# Patient Record
Sex: FEMALE | Race: BLACK OR AFRICAN AMERICAN | NOT HISPANIC OR LATINO | Employment: FULL TIME | URBAN - METROPOLITAN AREA
[De-identification: names, ages, dates, MRNs, and addresses within clinical notes are randomized per-mention and may not be internally consistent; named-entity substitution may affect disease eponyms.]

---

## 2023-04-19 LAB
ALANINE AMINOTRANSFERASE (SGPT) (U/L) IN SER/PLAS: 13 U/L (ref 7–45)
ALBUMIN (G/DL) IN SER/PLAS: 4.2 G/DL (ref 3.4–5)
ALKALINE PHOSPHATASE (U/L) IN SER/PLAS: 57 U/L (ref 33–110)
ANION GAP IN SER/PLAS: 11 MMOL/L (ref 10–20)
ASPARTATE AMINOTRANSFERASE (SGOT) (U/L) IN SER/PLAS: 11 U/L (ref 9–39)
BILIRUBIN TOTAL (MG/DL) IN SER/PLAS: 0.4 MG/DL (ref 0–1.2)
CALCIDIOL (25 OH VITAMIN D3) (NG/ML) IN SER/PLAS: 26 NG/ML
CALCIUM (MG/DL) IN SER/PLAS: 9.6 MG/DL (ref 8.6–10.6)
CARBON DIOXIDE, TOTAL (MMOL/L) IN SER/PLAS: 26 MMOL/L (ref 21–32)
CHLORIDE (MMOL/L) IN SER/PLAS: 104 MMOL/L (ref 98–107)
CHOLESTEROL (MG/DL) IN SER/PLAS: 165 MG/DL (ref 0–199)
CHOLESTEROL IN HDL (MG/DL) IN SER/PLAS: 57.7 MG/DL
CHOLESTEROL/HDL RATIO: 2.9
CREATININE (MG/DL) IN SER/PLAS: 0.67 MG/DL (ref 0.5–1.05)
ERYTHROCYTE DISTRIBUTION WIDTH (RATIO) BY AUTOMATED COUNT: 13.8 % (ref 11.5–14.5)
ERYTHROCYTE MEAN CORPUSCULAR HEMOGLOBIN CONCENTRATION (G/DL) BY AUTOMATED: 34.8 G/DL (ref 32–36)
ERYTHROCYTE MEAN CORPUSCULAR VOLUME (FL) BY AUTOMATED COUNT: 75 FL (ref 80–100)
ERYTHROCYTES (10*6/UL) IN BLOOD BY AUTOMATED COUNT: 4.62 X10E12/L (ref 4–5.2)
GFR FEMALE: >90 ML/MIN/1.73M2
GLUCOSE (MG/DL) IN SER/PLAS: 84 MG/DL (ref 74–99)
HEMATOCRIT (%) IN BLOOD BY AUTOMATED COUNT: 34.8 % (ref 36–46)
HEMOGLOBIN (G/DL) IN BLOOD: 12.1 G/DL (ref 12–16)
LDL: 97 MG/DL (ref 0–99)
LEUKOCYTES (10*3/UL) IN BLOOD BY AUTOMATED COUNT: 4.6 X10E9/L (ref 4.4–11.3)
NRBC (PER 100 WBCS) BY AUTOMATED COUNT: 0 /100 WBC (ref 0–0)
PLATELETS (10*3/UL) IN BLOOD AUTOMATED COUNT: 332 X10E9/L (ref 150–450)
POTASSIUM (MMOL/L) IN SER/PLAS: 4.1 MMOL/L (ref 3.5–5.3)
PROTEIN TOTAL: 7 G/DL (ref 6.4–8.2)
SODIUM (MMOL/L) IN SER/PLAS: 137 MMOL/L (ref 136–145)
THYROTROPIN (MIU/L) IN SER/PLAS BY DETECTION LIMIT <= 0.05 MIU/L: 0.71 MIU/L (ref 0.44–3.98)
THYROXINE (T4) FREE (NG/DL) IN SER/PLAS: 1.05 NG/DL (ref 0.78–1.48)
TRIGLYCERIDE (MG/DL) IN SER/PLAS: 53 MG/DL (ref 0–149)
UREA NITROGEN (MG/DL) IN SER/PLAS: 9 MG/DL (ref 6–23)
VLDL: 11 MG/DL (ref 0–40)

## 2023-09-12 LAB
CHLAMYDIA TRACH., AMPLIFIED: NEGATIVE
N. GONORRHEA, AMPLIFIED: NEGATIVE
TRICHOMONAS VAGINALIS: NEGATIVE
URINE CULTURE: NO GROWTH

## 2023-10-10 ENCOUNTER — LAB (OUTPATIENT)
Dept: LAB | Facility: LAB | Age: 33
End: 2023-10-10
Payer: COMMERCIAL

## 2023-10-10 DIAGNOSIS — Z32.01 ENCOUNTER FOR PREGNANCY TEST, RESULT POSITIVE (HHS-HCC): Primary | ICD-10-CM

## 2023-10-10 LAB
ABO GROUP (TYPE) IN BLOOD: NORMAL
ANTIBODY SCREEN: NORMAL
ERYTHROCYTE [DISTWIDTH] IN BLOOD BY AUTOMATED COUNT: 13.3 % (ref 11.5–14.5)
HCT VFR BLD AUTO: 29.9 % (ref 36–46)
HCV AB SER QL: NONREACTIVE
HGB BLD-MCNC: 10.7 G/DL (ref 12–16)
MCH RBC QN AUTO: 27.3 PG (ref 26–34)
MCHC RBC AUTO-ENTMCNC: 35.8 G/DL (ref 32–36)
MCV RBC AUTO: 76 FL (ref 80–100)
NRBC BLD-RTO: 0 /100 WBCS (ref 0–0)
PLATELET # BLD AUTO: 332 X10*3/UL (ref 150–450)
PMV BLD AUTO: 12.1 FL (ref 7.5–11.5)
RBC # BLD AUTO: 3.92 X10*6/UL (ref 4–5.2)
RH FACTOR (ANTIGEN D): NORMAL
WBC # BLD AUTO: 9.5 X10*3/UL (ref 4.4–11.3)

## 2023-10-10 PROCEDURE — 86803 HEPATITIS C AB TEST: CPT

## 2023-10-10 PROCEDURE — 86780 TREPONEMA PALLIDUM: CPT

## 2023-10-10 PROCEDURE — 83550 IRON BINDING TEST: CPT

## 2023-10-10 PROCEDURE — 86900 BLOOD TYPING SEROLOGIC ABO: CPT

## 2023-10-10 PROCEDURE — 86850 RBC ANTIBODY SCREEN: CPT

## 2023-10-10 PROCEDURE — 87340 HEPATITIS B SURFACE AG IA: CPT

## 2023-10-10 PROCEDURE — 83020 HEMOGLOBIN ELECTROPHORESIS: CPT | Performed by: PATHOLOGY

## 2023-10-10 PROCEDURE — 86317 IMMUNOASSAY INFECTIOUS AGENT: CPT

## 2023-10-10 PROCEDURE — 87389 HIV-1 AG W/HIV-1&-2 AB AG IA: CPT

## 2023-10-10 PROCEDURE — 83021 HEMOGLOBIN CHROMOTOGRAPHY: CPT

## 2023-10-10 PROCEDURE — 36415 COLL VENOUS BLD VENIPUNCTURE: CPT

## 2023-10-10 PROCEDURE — 83020 HEMOGLOBIN ELECTROPHORESIS: CPT

## 2023-10-10 PROCEDURE — 85027 COMPLETE CBC AUTOMATED: CPT

## 2023-10-10 PROCEDURE — 82728 ASSAY OF FERRITIN: CPT

## 2023-10-10 PROCEDURE — 86901 BLOOD TYPING SEROLOGIC RH(D): CPT

## 2023-10-11 PROBLEM — N90.89 VULVAR LESION: Status: ACTIVE | Noted: 2023-10-11

## 2023-10-11 PROBLEM — F41.9 ANXIETY: Status: ACTIVE | Noted: 2023-10-11

## 2023-10-11 PROBLEM — E55.9 VITAMIN D INSUFFICIENCY: Status: ACTIVE | Noted: 2023-10-11

## 2023-10-11 PROBLEM — D57.3 SICKLE CELL TRAIT (CMS-HCC): Status: ACTIVE | Noted: 2023-10-11

## 2023-10-11 PROBLEM — R63.0 ANOREXIA: Status: ACTIVE | Noted: 2023-10-11

## 2023-10-11 PROBLEM — M41.9 SCOLIOSIS: Status: ACTIVE | Noted: 2023-10-11

## 2023-10-11 LAB
FERRITIN SERPL-MCNC: 68 NG/ML
HBV SURFACE AG SERPL QL IA: NONREACTIVE
HIV 1+2 AB+HIV1 P24 AG SERPL QL IA: NONREACTIVE
IRON SATN MFR SERPL: 21 %
IRON SERPL-MCNC: 84 UG/DL
RUBV IGG SERPL IA-ACNC: 1.7 IA
RUBV IGG SERPL QL IA: POSITIVE
T PALLIDUM AB SER QL: NONREACTIVE
TIBC SERPL-MCNC: 392 UG/DL
UIBC SERPL-MCNC: 308 UG/DL

## 2023-10-11 RX ORDER — ETHYNODIOL DIACETATE AND ETHINYL ESTRADIOL 1 MG-35MCG
1 KIT ORAL DAILY
COMMUNITY
Start: 2015-10-27 | End: 2023-10-12

## 2023-10-11 RX ORDER — ERGOCALCIFEROL 1.25 MG/1
1.25 CAPSULE ORAL
COMMUNITY
Start: 2020-10-05

## 2023-10-12 ENCOUNTER — ROUTINE PRENATAL (OUTPATIENT)
Dept: OBSTETRICS AND GYNECOLOGY | Facility: CLINIC | Age: 33
End: 2023-10-12
Payer: COMMERCIAL

## 2023-10-12 VITALS — SYSTOLIC BLOOD PRESSURE: 112 MMHG | DIASTOLIC BLOOD PRESSURE: 50 MMHG | BODY MASS INDEX: 22.09 KG/M2 | WEIGHT: 115 LBS

## 2023-10-12 DIAGNOSIS — Z34.02 PREGNANCY TEST POSITIVE FOR NORMAL FIRST PREGNANCY IN SECOND TRIMESTER (HHS-HCC): Primary | ICD-10-CM

## 2023-10-12 DIAGNOSIS — Z3A.15 15 WEEKS GESTATION OF PREGNANCY (HHS-HCC): ICD-10-CM

## 2023-10-12 LAB
HGB A MFR BLD ELPH: 62.7 % (ref 95.8–98)
HGB A2 MFR BLD ELPH: 3.8 % (ref 2–3.3)
HGB F MFR BLD ELPH: 0 % (ref 0–0.9)
HGB FRACT BLD CE-IMP: ABNORMAL
HGB XXX MFR BLD ELPH: ABNORMAL %

## 2023-10-12 PROCEDURE — 0501F PRENATAL FLOW SHEET: CPT | Performed by: ADVANCED PRACTICE MIDWIFE

## 2023-10-12 RX ORDER — NAPROXEN SODIUM 220 MG/1
162 TABLET, FILM COATED ORAL DAILY
Qty: 60 TABLET | Refills: 11 | Status: SHIPPED | OUTPATIENT
Start: 2023-10-12 | End: 2023-10-27 | Stop reason: SDUPTHER

## 2023-10-12 NOTE — PROGRESS NOTES
"Assessment/Plan   Problem List Items Addressed This Visit    None  Visit Diagnoses         Codes    Pregnancy test positive for normal first pregnancy in second trimester    -  Primary Z34.02    Relevant Orders    US [facility] OB imaging order    15 weeks gestation of pregnancy     Z3A.15            Labs reviewed.  Anatomy US scheduled  To call and get scheduled.  Order placed.  Reviewed s/sx of PTL, warning signs, fetal movement counts, and when to call provider  Follow up in 4 weeks for a routine prenatal visit.    Martha Osborn, JASWANT-BRISA Pierre     Thang Holland is a 33 y.o.  at 15w1d with a working estimated date of delivery of 4/3/2024, by Last Menstrual Period who presents for a routine prenatal visit. She denies vaginal bleeding, leakage of fluid, decreased fetal movements, or contractions.    Her pregnancy is complicated by:  Pregnancy Problems (from 23 to present)       No problems associated with this episode.             Objective   Physical Exam  Weight: 52.2 kg (115 lb)  Expected Total Weight Gain: Could not be calculated   Pregravid BMI: Could not be calculated  BP: 112/50         Postpartum Depression: Not on file       Prenatal Labs  Lab Results   Component Value Date    HGB 10.7 (L) 10/10/2023    HCT 29.9 (L) 10/10/2023    ABO B 10/10/2023    HEPBSAG Nonreactive 10/10/2023     No results found for: \"GLUC1P\", \"GL3\"      A:  IUP @ 15 weeks        S=D    P:  Discussed quickening and round ligament pain       Anatomy ultrasound placed, numbers given to call       Declines aneuploidy screening       RTO 4 weeks     "

## 2023-10-26 DIAGNOSIS — Z34.02 PREGNANCY TEST POSITIVE FOR NORMAL FIRST PREGNANCY IN SECOND TRIMESTER (HHS-HCC): ICD-10-CM

## 2023-10-27 RX ORDER — NAPROXEN SODIUM 220 MG/1
162 TABLET, FILM COATED ORAL DAILY
Qty: 180 TABLET | Refills: 4 | Status: SHIPPED | OUTPATIENT
Start: 2023-10-27 | End: 2024-03-09 | Stop reason: HOSPADM

## 2023-11-03 ENCOUNTER — APPOINTMENT (OUTPATIENT)
Dept: RADIOLOGY | Facility: CLINIC | Age: 33
End: 2023-11-03
Payer: COMMERCIAL

## 2023-11-06 ENCOUNTER — ROUTINE PRENATAL (OUTPATIENT)
Dept: OBSTETRICS AND GYNECOLOGY | Facility: CLINIC | Age: 33
End: 2023-11-06
Payer: COMMERCIAL

## 2023-11-06 ENCOUNTER — ANCILLARY PROCEDURE (OUTPATIENT)
Dept: RADIOLOGY | Facility: CLINIC | Age: 33
End: 2023-11-06
Payer: COMMERCIAL

## 2023-11-06 VITALS — BODY MASS INDEX: 22.09 KG/M2 | DIASTOLIC BLOOD PRESSURE: 60 MMHG | SYSTOLIC BLOOD PRESSURE: 102 MMHG | WEIGHT: 115 LBS

## 2023-11-06 DIAGNOSIS — Z34.02 PREGNANCY TEST POSITIVE FOR NORMAL FIRST PREGNANCY IN SECOND TRIMESTER (HHS-HCC): ICD-10-CM

## 2023-11-06 DIAGNOSIS — Z34.02 PREGNANCY TEST POSITIVE FOR NORMAL FIRST PREGNANCY IN SECOND TRIMESTER (HHS-HCC): Primary | ICD-10-CM

## 2023-11-06 PROCEDURE — 76805 OB US >/= 14 WKS SNGL FETUS: CPT

## 2023-11-06 PROCEDURE — 90662 IIV NO PRSV INCREASED AG IM: CPT | Performed by: OBSTETRICS & GYNECOLOGY

## 2023-11-06 PROCEDURE — 0501F PRENATAL FLOW SHEET: CPT | Performed by: OBSTETRICS & GYNECOLOGY

## 2023-11-06 PROCEDURE — 90471 IMMUNIZATION ADMIN: CPT | Performed by: OBSTETRICS & GYNECOLOGY

## 2023-11-06 PROCEDURE — 76811 OB US DETAILED SNGL FETUS: CPT | Performed by: OBSTETRICS & GYNECOLOGY

## 2023-11-06 NOTE — PROGRESS NOTES
33-year-old G1, P0 presents at 18 weeks and 5 days by LMP consistent with second menstrual ultrasound for follow-up OB visit.  She states that she is feeling well.  She denies nausea, vomiting, abnormal discharge, symptoms of PTL or PEC.    She reports compliance with vitamin D, prenatal vitamins and her baby aspirin.    Recent ultrasound showed normal anatomy.    Hemoglobin electrophoresis was abnormal.  The patient states that the FOB still needs to do testing for sickle cell trait.    A: JOSÉ     -  Genetics for abnormal hemoglobin ID    -  FOB to get testing performed     -  Flu vaccine today     -  RTC Q 4 weeks

## 2023-11-09 LAB — REFLEX ADDED, ANEMIA PANEL: NORMAL

## 2023-12-18 ENCOUNTER — ROUTINE PRENATAL (OUTPATIENT)
Dept: OBSTETRICS AND GYNECOLOGY | Facility: CLINIC | Age: 33
End: 2023-12-18
Payer: COMMERCIAL

## 2023-12-18 VITALS — SYSTOLIC BLOOD PRESSURE: 100 MMHG | BODY MASS INDEX: 24.78 KG/M2 | WEIGHT: 129 LBS | DIASTOLIC BLOOD PRESSURE: 60 MMHG

## 2023-12-18 DIAGNOSIS — Z3A.24 24 WEEKS GESTATION OF PREGNANCY (HHS-HCC): Primary | ICD-10-CM

## 2023-12-18 DIAGNOSIS — Z34.02 ENCOUNTER FOR SUPERVISION OF NORMAL FIRST PREGNANCY, SECOND TRIMESTER (HHS-HCC): ICD-10-CM

## 2023-12-18 PROCEDURE — 0501F PRENATAL FLOW SHEET: CPT

## 2023-12-18 NOTE — PROGRESS NOTES
Assessment/Plan   Diagnoses and all orders for this visit:  24 weeks gestation of pregnancy  Encounter for supervision of normal first pregnancy, second trimester      Labs reviewed.  Discussed diabetes screening and routine labs, to be completed next visit  Discussed delayed cord clamping. Plan to discuss birth plan more in detail at later visit.   Patient is doing well managing heartburn at home. Reviewed sitting upright and hydration.   Reviewed s/sx of PTL, warning signs, fetal movement counts, and when to call provider  Follow up in 4 weeks for a routine prenatal visit.    JASWANT Gonsales-BRISA    Subjective     Thang Holland is a 33 y.o.  at 24w5d with a working estimated date of delivery of 4/3/2024, by Last Menstrual Period who presents for a routine prenatal visit. She denies vaginal bleeding, leakage of fluid, decreased fetal movements, or contractions.  Pt complains of:  heartburn     Patient also expressed desire for delayed cord clamping. Patient also expressed concerns about hx of scoliosis surgery and epidural placement.     Her pregnancy is complicated by:  Pregnancy Problems (from 23 to present)       No problems associated with this episode.             Objective   Physical Exam  Weight: 58.5 kg (129 lb)  Expected Total Weight Gain: 11.5 kg (25 lb)-16 kg (35 lb)   Pregravid BMI: 21.12  BP: 100/60  Fetal Heart Rate: 160 Fundal Height (cm): 25 cm  Fetal Heart Rate: 160 Fundal Height (cm): 25 cm

## 2024-01-16 ENCOUNTER — ROUTINE PRENATAL (OUTPATIENT)
Dept: OBSTETRICS AND GYNECOLOGY | Facility: CLINIC | Age: 34
End: 2024-01-16
Payer: COMMERCIAL

## 2024-01-16 VITALS — DIASTOLIC BLOOD PRESSURE: 60 MMHG | WEIGHT: 138 LBS | SYSTOLIC BLOOD PRESSURE: 100 MMHG | BODY MASS INDEX: 26.51 KG/M2

## 2024-01-16 DIAGNOSIS — Z34.03 ENCOUNTER FOR SUPERVISION OF NORMAL FIRST PREGNANCY IN THIRD TRIMESTER (HHS-HCC): Primary | ICD-10-CM

## 2024-01-16 LAB
ERYTHROCYTE [DISTWIDTH] IN BLOOD BY AUTOMATED COUNT: 13.1 % (ref 11.5–14.5)
HCT VFR BLD AUTO: 27.2 % (ref 36–46)
HGB BLD-MCNC: 9.5 G/DL (ref 12–16)
MCH RBC QN AUTO: 27.6 PG (ref 26–34)
MCHC RBC AUTO-ENTMCNC: 34.9 G/DL (ref 32–36)
MCV RBC AUTO: 79 FL (ref 80–100)
NRBC BLD-RTO: 0 /100 WBCS (ref 0–0)
PLATELET # BLD AUTO: 294 X10*3/UL (ref 150–450)
RBC # BLD AUTO: 3.44 X10*6/UL (ref 4–5.2)
REFLEX ADDED, ANEMIA PANEL: NORMAL
WBC # BLD AUTO: 12.3 X10*3/UL (ref 4.4–11.3)

## 2024-01-16 PROCEDURE — 85027 COMPLETE CBC AUTOMATED: CPT

## 2024-01-16 PROCEDURE — 0501F PRENATAL FLOW SHEET: CPT | Performed by: OBSTETRICS & GYNECOLOGY

## 2024-01-16 PROCEDURE — 82728 ASSAY OF FERRITIN: CPT

## 2024-01-16 PROCEDURE — 86780 TREPONEMA PALLIDUM: CPT

## 2024-01-16 PROCEDURE — 82947 ASSAY GLUCOSE BLOOD QUANT: CPT

## 2024-01-16 PROCEDURE — 83550 IRON BINDING TEST: CPT

## 2024-01-16 PROCEDURE — 36415 COLL VENOUS BLD VENIPUNCTURE: CPT

## 2024-01-16 ASSESSMENT — EDINBURGH POSTNATAL DEPRESSION SCALE (EPDS)
I HAVE BEEN SO UNHAPPY THAT I HAVE BEEN CRYING: NO, NEVER
THE THOUGHT OF HARMING MYSELF HAS OCCURRED TO ME: NEVER
I HAVE BEEN ABLE TO LAUGH AND SEE THE FUNNY SIDE OF THINGS: AS MUCH AS I ALWAYS COULD
I HAVE FELT SCARED OR PANICKY FOR NO GOOD REASON: NO, NOT AT ALL
I HAVE FELT SAD OR MISERABLE: NOT VERY OFTEN
I HAVE BLAMED MYSELF UNNECESSARILY WHEN THINGS WENT WRONG: NOT VERY OFTEN
I HAVE BEEN SO UNHAPPY THAT I HAVE HAD DIFFICULTY SLEEPING: NOT VERY OFTEN
THINGS HAVE BEEN GETTING ON TOP OF ME: NO, I HAVE BEEN COPING AS WELL AS EVER
I HAVE LOOKED FORWARD WITH ENJOYMENT TO THINGS: AS MUCH AS I EVER DID
I HAVE BEEN ANXIOUS OR WORRIED FOR NO GOOD REASON: HARDLY EVER
TOTAL SCORE: 4

## 2024-01-16 NOTE — PROGRESS NOTES
The patient reports good fetal movement.  She is without symptoms of PTL or PEC.  She is unsure what she would like to use for birth control.  She plans to breast-feed.  She wants to deliver at the main campus.    She has second trimester labs drawn today.  She plans to get the Tdap at her next visit.    A/P: JOSÉ    -  Fetal movement     -  BC info     -  Tdap at next visit     -  Second trimester labs today    -  Breast Pump rx

## 2024-01-17 LAB
FERRITIN SERPL-MCNC: 23 NG/ML
GLUCOSE 1H P 50 G GLC PO SERPL-MCNC: 104 MG/DL
IRON SATN MFR SERPL: NORMAL %
IRON SERPL-MCNC: 49 UG/DL
TIBC SERPL-MCNC: NORMAL UG/DL
TREPONEMA PALLIDUM IGG+IGM AB [PRESENCE] IN SERUM OR PLASMA BY IMMUNOASSAY: NONREACTIVE
UIBC SERPL-MCNC: >450 UG/DL

## 2024-01-19 DIAGNOSIS — O99.013 ANEMIA AFFECTING PREGNANCY IN THIRD TRIMESTER (HHS-HCC): Primary | ICD-10-CM

## 2024-01-19 RX ORDER — FERROUS SULFATE 325(65) MG
325 TABLET, DELAYED RELEASE (ENTERIC COATED) ORAL
Qty: 60 TABLET | Refills: 11 | Status: SHIPPED | OUTPATIENT
Start: 2024-01-19 | End: 2025-01-18

## 2024-01-22 ENCOUNTER — TELEPHONE (OUTPATIENT)
Dept: OBSTETRICS AND GYNECOLOGY | Facility: CLINIC | Age: 34
End: 2024-01-22
Payer: COMMERCIAL

## 2024-01-22 NOTE — TELEPHONE ENCOUNTER
Pt verified by name and .  Pt is aware of Dr. Sigala's message;  Chelsea Sigala MD  [facility] [address] Clinical Support Staff  Iron rx sent to the pharmacy. She may use [facility] prn.  Pt has no questions at this time.

## 2024-01-28 ENCOUNTER — HOSPITAL ENCOUNTER (EMERGENCY)
Facility: HOSPITAL | Age: 34
Discharge: OTHER INSTITUTION | DRG: 833 | End: 2024-01-28
Attending: EMERGENCY MEDICINE
Payer: COMMERCIAL

## 2024-01-28 ENCOUNTER — HOSPITAL ENCOUNTER (INPATIENT)
Facility: HOSPITAL | Age: 34
LOS: 3 days | Discharge: HOME | DRG: 833 | End: 2024-01-31
Attending: STUDENT IN AN ORGANIZED HEALTH CARE EDUCATION/TRAINING PROGRAM | Admitting: STUDENT IN AN ORGANIZED HEALTH CARE EDUCATION/TRAINING PROGRAM
Payer: COMMERCIAL

## 2024-01-28 ENCOUNTER — HOSPITAL ENCOUNTER (INPATIENT)
Facility: HOSPITAL | Age: 34
End: 2024-01-28
Attending: STUDENT IN AN ORGANIZED HEALTH CARE EDUCATION/TRAINING PROGRAM | Admitting: STUDENT IN AN ORGANIZED HEALTH CARE EDUCATION/TRAINING PROGRAM
Payer: COMMERCIAL

## 2024-01-28 VITALS
RESPIRATION RATE: 18 BRPM | WEIGHT: 130 LBS | HEIGHT: 60 IN | BODY MASS INDEX: 25.52 KG/M2 | HEART RATE: 90 BPM | OXYGEN SATURATION: 98 % | SYSTOLIC BLOOD PRESSURE: 120 MMHG | TEMPERATURE: 98.1 F | DIASTOLIC BLOOD PRESSURE: 77 MMHG

## 2024-01-28 DIAGNOSIS — O46.93 VAGINAL BLEEDING IN PREGNANCY, THIRD TRIMESTER (HHS-HCC): Primary | ICD-10-CM

## 2024-01-28 LAB
ABO GROUP (TYPE) IN BLOOD: NORMAL
ABO GROUP (TYPE) IN BLOOD: NORMAL
ALBUMIN SERPL-MCNC: 3.1 G/DL (ref 3.5–5)
ALP BLD-CCNC: 159 U/L (ref 35–125)
ALT SERPL-CCNC: 16 U/L (ref 5–40)
ANION GAP SERPL CALC-SCNC: 12 MMOL/L
ANTIBODY SCREEN: NORMAL
ANTIBODY SCREEN: NORMAL
APPEARANCE UR: CLEAR
APTT PPP: 29 SECONDS (ref 27–38)
APTT PPP: 30 SECONDS (ref 27–38)
AST SERPL-CCNC: 19 U/L (ref 5–40)
BACTERIA #/AREA URNS AUTO: ABNORMAL /HPF
BASOPHILS # BLD AUTO: 0.02 X10*3/UL (ref 0–0.1)
BASOPHILS NFR BLD AUTO: 0.2 %
BILIRUB SERPL-MCNC: <0.2 MG/DL (ref 0.1–1.2)
BILIRUB UR STRIP.AUTO-MCNC: NEGATIVE MG/DL
BUN SERPL-MCNC: 11 MG/DL (ref 8–25)
CALCIUM SERPL-MCNC: 9.5 MG/DL (ref 8.5–10.4)
CHLORIDE SERPL-SCNC: 104 MMOL/L (ref 97–107)
CO2 SERPL-SCNC: 19 MMOL/L (ref 24–31)
COLOR UR: COLORLESS
CREAT SERPL-MCNC: 0.7 MG/DL (ref 0.4–1.6)
EGFRCR SERPLBLD CKD-EPI 2021: >90 ML/MIN/1.73M*2
EOSINOPHIL # BLD AUTO: 0.15 X10*3/UL (ref 0–0.7)
EOSINOPHIL NFR BLD AUTO: 1.2 %
ERYTHROCYTE [DISTWIDTH] IN BLOOD BY AUTOMATED COUNT: 12.7 % (ref 11.5–14.5)
ERYTHROCYTE [DISTWIDTH] IN BLOOD BY AUTOMATED COUNT: 12.7 % (ref 11.5–14.5)
ERYTHROCYTE [DISTWIDTH] IN BLOOD BY AUTOMATED COUNT: 13.2 % (ref 11.5–14.5)
FIBRINOGEN PPP-MCNC: 431 MG/DL (ref 200–400)
FIBRINOGEN PPP-MCNC: 458 MG/DL (ref 200–400)
GLUCOSE SERPL-MCNC: 80 MG/DL (ref 65–99)
GLUCOSE UR STRIP.AUTO-MCNC: NORMAL MG/DL
HCT VFR BLD AUTO: 21.4 % (ref 36–46)
HCT VFR BLD AUTO: 21.6 % (ref 36–46)
HCT VFR BLD AUTO: 23.3 % (ref 36–46)
HGB BLD-MCNC: 7.6 G/DL (ref 12–16)
HGB BLD-MCNC: 7.7 G/DL (ref 12–16)
HGB BLD-MCNC: 8.2 G/DL (ref 12–16)
HOLD SPECIMEN: NORMAL
IMM GRANULOCYTES # BLD AUTO: 0.08 X10*3/UL (ref 0–0.7)
IMM GRANULOCYTES NFR BLD AUTO: 0.7 % (ref 0–0.9)
INR PPP: 0.9 (ref 0.9–1.1)
INR PPP: 0.9 (ref 0.9–1.1)
KETONES UR STRIP.AUTO-MCNC: NEGATIVE MG/DL
LEUKOCYTE ESTERASE UR QL STRIP.AUTO: ABNORMAL
LYMPHOCYTES # BLD AUTO: 2.89 X10*3/UL (ref 1.2–4.8)
LYMPHOCYTES NFR BLD AUTO: 24 %
MCH RBC QN AUTO: 26.9 PG (ref 26–34)
MCH RBC QN AUTO: 27 PG (ref 26–34)
MCH RBC QN AUTO: 27.5 PG (ref 26–34)
MCHC RBC AUTO-ENTMCNC: 35.2 G/DL (ref 32–36)
MCHC RBC AUTO-ENTMCNC: 35.2 G/DL (ref 32–36)
MCHC RBC AUTO-ENTMCNC: 36 G/DL (ref 32–36)
MCV RBC AUTO: 76 FL (ref 80–100)
MCV RBC AUTO: 76 FL (ref 80–100)
MCV RBC AUTO: 77 FL (ref 80–100)
MONOCYTES # BLD AUTO: 0.77 X10*3/UL (ref 0.1–1)
MONOCYTES NFR BLD AUTO: 6.4 %
NEUTROPHILS # BLD AUTO: 8.12 X10*3/UL (ref 1.2–7.7)
NEUTROPHILS NFR BLD AUTO: 67.5 %
NITRITE UR QL STRIP.AUTO: NEGATIVE
NRBC BLD-RTO: 0 /100 WBCS (ref 0–0)
PH UR STRIP.AUTO: 6 [PH]
PLATELET # BLD AUTO: 226 X10*3/UL (ref 150–450)
PLATELET # BLD AUTO: 238 X10*3/UL (ref 150–450)
PLATELET # BLD AUTO: 241 X10*3/UL (ref 150–450)
POTASSIUM SERPL-SCNC: 3.9 MMOL/L (ref 3.4–5.1)
PROT SERPL-MCNC: 6.6 G/DL (ref 5.9–7.9)
PROT UR STRIP.AUTO-MCNC: NEGATIVE MG/DL
PROTHROMBIN TIME: 10.1 SECONDS (ref 9.8–12.8)
PROTHROMBIN TIME: 10.2 SECONDS (ref 9.8–12.8)
RBC # BLD AUTO: 2.8 X10*6/UL (ref 4–5.2)
RBC # BLD AUTO: 2.82 X10*6/UL (ref 4–5.2)
RBC # BLD AUTO: 3.05 X10*6/UL (ref 4–5.2)
RBC # UR STRIP.AUTO: ABNORMAL /UL
RBC #/AREA URNS AUTO: ABNORMAL /HPF
RH FACTOR (ANTIGEN D): NORMAL
RH FACTOR (ANTIGEN D): NORMAL
SODIUM SERPL-SCNC: 135 MMOL/L (ref 133–145)
SP GR UR STRIP.AUTO: 1
SQUAMOUS #/AREA URNS AUTO: ABNORMAL /HPF
UROBILINOGEN UR STRIP.AUTO-MCNC: NORMAL MG/DL
WBC # BLD AUTO: 10.4 X10*3/UL (ref 4.4–11.3)
WBC # BLD AUTO: 11.4 X10*3/UL (ref 4.4–11.3)
WBC # BLD AUTO: 12 X10*3/UL (ref 4.4–11.3)
WBC #/AREA URNS AUTO: ABNORMAL /HPF

## 2024-01-28 PROCEDURE — 59025 FETAL NON-STRESS TEST: CPT | Performed by: STUDENT IN AN ORGANIZED HEALTH CARE EDUCATION/TRAINING PROGRAM

## 2024-01-28 PROCEDURE — 81001 URINALYSIS AUTO W/SCOPE: CPT | Performed by: EMERGENCY MEDICINE

## 2024-01-28 PROCEDURE — 2500000004 HC RX 250 GENERAL PHARMACY W/ HCPCS (ALT 636 FOR OP/ED): Performed by: STUDENT IN AN ORGANIZED HEALTH CARE EDUCATION/TRAINING PROGRAM

## 2024-01-28 PROCEDURE — 86920 COMPATIBILITY TEST SPIN: CPT

## 2024-01-28 PROCEDURE — 85027 COMPLETE CBC AUTOMATED: CPT | Performed by: STUDENT IN AN ORGANIZED HEALTH CARE EDUCATION/TRAINING PROGRAM

## 2024-01-28 PROCEDURE — 85025 COMPLETE CBC W/AUTO DIFF WBC: CPT | Performed by: EMERGENCY MEDICINE

## 2024-01-28 PROCEDURE — 85610 PROTHROMBIN TIME: CPT | Performed by: STUDENT IN AN ORGANIZED HEALTH CARE EDUCATION/TRAINING PROGRAM

## 2024-01-28 PROCEDURE — 86901 BLOOD TYPING SEROLOGIC RH(D): CPT | Performed by: EMERGENCY MEDICINE

## 2024-01-28 PROCEDURE — 87081 CULTURE SCREEN ONLY: CPT | Performed by: STUDENT IN AN ORGANIZED HEALTH CARE EDUCATION/TRAINING PROGRAM

## 2024-01-28 PROCEDURE — 1220000001 HC OB SEMI-PRIVATE ROOM DAILY

## 2024-01-28 PROCEDURE — 85384 FIBRINOGEN ACTIVITY: CPT | Performed by: STUDENT IN AN ORGANIZED HEALTH CARE EDUCATION/TRAINING PROGRAM

## 2024-01-28 PROCEDURE — 59025 FETAL NON-STRESS TEST: CPT | Mod: GC | Performed by: STUDENT IN AN ORGANIZED HEALTH CARE EDUCATION/TRAINING PROGRAM

## 2024-01-28 PROCEDURE — 80053 COMPREHEN METABOLIC PANEL: CPT | Performed by: EMERGENCY MEDICINE

## 2024-01-28 PROCEDURE — 36415 COLL VENOUS BLD VENIPUNCTURE: CPT | Performed by: EMERGENCY MEDICINE

## 2024-01-28 PROCEDURE — 99223 1ST HOSP IP/OBS HIGH 75: CPT | Performed by: STUDENT IN AN ORGANIZED HEALTH CARE EDUCATION/TRAINING PROGRAM

## 2024-01-28 PROCEDURE — 86901 BLOOD TYPING SEROLOGIC RH(D): CPT | Performed by: STUDENT IN AN ORGANIZED HEALTH CARE EDUCATION/TRAINING PROGRAM

## 2024-01-28 PROCEDURE — 99285 EMERGENCY DEPT VISIT HI MDM: CPT | Performed by: EMERGENCY MEDICINE

## 2024-01-28 PROCEDURE — 2500000001 HC RX 250 WO HCPCS SELF ADMINISTERED DRUGS (ALT 637 FOR MEDICARE OP): Performed by: STUDENT IN AN ORGANIZED HEALTH CARE EDUCATION/TRAINING PROGRAM

## 2024-01-28 PROCEDURE — 36415 COLL VENOUS BLD VENIPUNCTURE: CPT | Performed by: STUDENT IN AN ORGANIZED HEALTH CARE EDUCATION/TRAINING PROGRAM

## 2024-01-28 RX ORDER — LIDOCAINE HYDROCHLORIDE 10 MG/ML
0.5 INJECTION INFILTRATION; PERINEURAL ONCE AS NEEDED
Status: DISCONTINUED | OUTPATIENT
Start: 2024-01-28 | End: 2024-01-31 | Stop reason: HOSPADM

## 2024-01-28 RX ORDER — SODIUM CHLORIDE, SODIUM LACTATE, POTASSIUM CHLORIDE, CALCIUM CHLORIDE 600; 310; 30; 20 MG/100ML; MG/100ML; MG/100ML; MG/100ML
125 INJECTION, SOLUTION INTRAVENOUS CONTINUOUS
Status: DISCONTINUED | OUTPATIENT
Start: 2024-01-28 | End: 2024-01-31 | Stop reason: HOSPADM

## 2024-01-28 RX ORDER — POLYETHYLENE GLYCOL 3350 17 G/17G
17 POWDER, FOR SOLUTION ORAL 2 TIMES DAILY PRN
Status: DISCONTINUED | OUTPATIENT
Start: 2024-01-28 | End: 2024-01-31 | Stop reason: HOSPADM

## 2024-01-28 RX ORDER — FERROUS SULFATE 325(65) MG
65 TABLET ORAL DAILY
Status: DISCONTINUED | OUTPATIENT
Start: 2024-01-28 | End: 2024-01-29 | Stop reason: ALTCHOICE

## 2024-01-28 RX ORDER — METOCLOPRAMIDE HYDROCHLORIDE 5 MG/ML
10 INJECTION INTRAMUSCULAR; INTRAVENOUS EVERY 6 HOURS PRN
Status: DISCONTINUED | OUTPATIENT
Start: 2024-01-28 | End: 2024-01-31 | Stop reason: HOSPADM

## 2024-01-28 RX ORDER — ONDANSETRON HYDROCHLORIDE 2 MG/ML
4 INJECTION, SOLUTION INTRAVENOUS EVERY 6 HOURS PRN
Status: DISCONTINUED | OUTPATIENT
Start: 2024-01-28 | End: 2024-01-31 | Stop reason: HOSPADM

## 2024-01-28 RX ORDER — LABETALOL HYDROCHLORIDE 5 MG/ML
20 INJECTION, SOLUTION INTRAVENOUS ONCE AS NEEDED
Status: DISCONTINUED | OUTPATIENT
Start: 2024-01-28 | End: 2024-01-31 | Stop reason: HOSPADM

## 2024-01-28 RX ORDER — METOCLOPRAMIDE 10 MG/1
10 TABLET ORAL EVERY 6 HOURS PRN
Status: DISCONTINUED | OUTPATIENT
Start: 2024-01-28 | End: 2024-01-31 | Stop reason: HOSPADM

## 2024-01-28 RX ORDER — ADHESIVE BANDAGE
10 BANDAGE TOPICAL
Status: DISCONTINUED | OUTPATIENT
Start: 2024-01-28 | End: 2024-01-31 | Stop reason: HOSPADM

## 2024-01-28 RX ORDER — NIFEDIPINE 10 MG/1
10 CAPSULE ORAL ONCE AS NEEDED
Status: DISCONTINUED | OUTPATIENT
Start: 2024-01-28 | End: 2024-01-31 | Stop reason: HOSPADM

## 2024-01-28 RX ORDER — SIMETHICONE 80 MG
80 TABLET,CHEWABLE ORAL 4 TIMES DAILY PRN
Status: DISCONTINUED | OUTPATIENT
Start: 2024-01-28 | End: 2024-01-31 | Stop reason: HOSPADM

## 2024-01-28 RX ORDER — BISACODYL 10 MG/1
10 SUPPOSITORY RECTAL DAILY PRN
Status: DISCONTINUED | OUTPATIENT
Start: 2024-01-28 | End: 2024-01-31 | Stop reason: HOSPADM

## 2024-01-28 RX ORDER — ONDANSETRON 4 MG/1
4 TABLET, FILM COATED ORAL EVERY 6 HOURS PRN
Status: DISCONTINUED | OUTPATIENT
Start: 2024-01-28 | End: 2024-01-31 | Stop reason: HOSPADM

## 2024-01-28 RX ORDER — BETAMETHASONE SODIUM PHOSPHATE AND BETAMETHASONE ACETATE 3; 3 MG/ML; MG/ML
12 INJECTION, SUSPENSION INTRA-ARTICULAR; INTRALESIONAL; INTRAMUSCULAR; SOFT TISSUE EVERY 24 HOURS
Status: COMPLETED | OUTPATIENT
Start: 2024-01-28 | End: 2024-01-29

## 2024-01-28 RX ORDER — HYDRALAZINE HYDROCHLORIDE 20 MG/ML
5 INJECTION INTRAMUSCULAR; INTRAVENOUS ONCE AS NEEDED
Status: DISCONTINUED | OUTPATIENT
Start: 2024-01-28 | End: 2024-01-31 | Stop reason: HOSPADM

## 2024-01-28 RX ADMIN — FERROUS SULFATE TAB 325 MG (65 MG ELEMENTAL FE) 1 TABLET: 325 (65 FE) TAB at 09:13

## 2024-01-28 RX ADMIN — IRON SUCROSE 300 MG: 20 INJECTION, SOLUTION INTRAVENOUS at 12:26

## 2024-01-28 RX ADMIN — SODIUM CHLORIDE, POTASSIUM CHLORIDE, SODIUM LACTATE AND CALCIUM CHLORIDE 125 ML/HR: 600; 310; 30; 20 INJECTION, SOLUTION INTRAVENOUS at 14:17

## 2024-01-28 RX ADMIN — BETAMETHASONE ACETATE AND BETAMETHASONE SODIUM PHOSPHATE 12 MG: 3; 3 INJECTION, SUSPENSION INTRA-ARTICULAR; INTRALESIONAL; INTRAMUSCULAR; SOFT TISSUE at 06:29

## 2024-01-28 RX ADMIN — PRENATAL VIT W/ FE FUMARATE-FA TAB 27-0.8 MG 1 TABLET: 27-0.8 TAB at 09:13

## 2024-01-28 RX ADMIN — SODIUM CHLORIDE, POTASSIUM CHLORIDE, SODIUM LACTATE AND CALCIUM CHLORIDE 125 ML/HR: 600; 310; 30; 20 INJECTION, SOLUTION INTRAVENOUS at 03:05

## 2024-01-28 RX ADMIN — SODIUM CHLORIDE, POTASSIUM CHLORIDE, SODIUM LACTATE AND CALCIUM CHLORIDE 125 ML/HR: 600; 310; 30; 20 INJECTION, SOLUTION INTRAVENOUS at 09:15

## 2024-01-28 SDOH — HEALTH STABILITY: MENTAL HEALTH: WISH TO BE DEAD (PAST 1 MONTH): NO

## 2024-01-28 SDOH — SOCIAL STABILITY: SOCIAL INSECURITY: HAVE YOU HAD THOUGHTS OF HARMING ANYONE ELSE?: YES

## 2024-01-28 SDOH — SOCIAL STABILITY: SOCIAL INSECURITY: ARE THERE ANY APPARENT SIGNS OF INJURIES/BEHAVIORS THAT COULD BE RELATED TO ABUSE/NEGLECT?: NO

## 2024-01-28 SDOH — SOCIAL STABILITY: SOCIAL INSECURITY: HAS ANYONE EVER THREATENED TO HURT YOUR FAMILY OR YOUR PETS?: NO

## 2024-01-28 SDOH — SOCIAL STABILITY: SOCIAL INSECURITY: PHYSICAL ABUSE: DENIES

## 2024-01-28 SDOH — SOCIAL STABILITY: SOCIAL INSECURITY: ABUSE SCREEN: ADULT

## 2024-01-28 SDOH — HEALTH STABILITY: MENTAL HEALTH: NON-SPECIFIC ACTIVE SUICIDAL THOUGHTS (PAST 1 MONTH): NO

## 2024-01-28 SDOH — SOCIAL STABILITY: SOCIAL INSECURITY: VERBAL ABUSE: DENIES

## 2024-01-28 SDOH — HEALTH STABILITY: MENTAL HEALTH: SUICIDAL BEHAVIOR (LIFETIME): NO

## 2024-01-28 SDOH — SOCIAL STABILITY: SOCIAL INSECURITY: DO YOU FEEL ANYONE HAS EXPLOITED OR TAKEN ADVANTAGE OF YOU FINANCIALLY OR OF YOUR PERSONAL PROPERTY?: NO

## 2024-01-28 SDOH — SOCIAL STABILITY: SOCIAL INSECURITY: DOES ANYONE TRY TO KEEP YOU FROM HAVING/CONTACTING OTHER FRIENDS OR DOING THINGS OUTSIDE YOUR HOME?: NO

## 2024-01-28 SDOH — HEALTH STABILITY: MENTAL HEALTH: WERE YOU ABLE TO COMPLETE ALL THE BEHAVIORAL HEALTH SCREENINGS?: YES

## 2024-01-28 SDOH — SOCIAL STABILITY: SOCIAL INSECURITY: ARE YOU OR HAVE YOU BEEN THREATENED OR ABUSED PHYSICALLY, EMOTIONALLY, OR SEXUALLY BY ANYONE?: NO

## 2024-01-28 SDOH — ECONOMIC STABILITY: HOUSING INSECURITY: DO YOU FEEL UNSAFE GOING BACK TO THE PLACE WHERE YOU ARE LIVING?: NO

## 2024-01-28 ASSESSMENT — PAIN DESCRIPTION - ONSET
ONSET: AWAKENED FROM SLEEP
ONSET: AWAKENED FROM SLEEP

## 2024-01-28 ASSESSMENT — PAIN SCALES - GENERAL
PAINLEVEL_OUTOF10: 0 - NO PAIN
PAINLEVEL_OUTOF10: 0 - NO PAIN
PAINLEVEL_OUTOF10: 2
PAINLEVEL_OUTOF10: 2
PAINLEVEL_OUTOF10: 0 - NO PAIN

## 2024-01-28 ASSESSMENT — PATIENT HEALTH QUESTIONNAIRE - PHQ9
2. FEELING DOWN, DEPRESSED OR HOPELESS: NOT AT ALL
1. LITTLE INTEREST OR PLEASURE IN DOING THINGS: NOT AT ALL
SUM OF ALL RESPONSES TO PHQ9 QUESTIONS 1 & 2: 0

## 2024-01-28 ASSESSMENT — PAIN DESCRIPTION - LOCATION
LOCATION: ABDOMEN
LOCATION: PELVIS

## 2024-01-28 ASSESSMENT — PAIN - FUNCTIONAL ASSESSMENT
PAIN_FUNCTIONAL_ASSESSMENT: 0-10

## 2024-01-28 ASSESSMENT — PAIN DESCRIPTION - PROGRESSION
CLINICAL_PROGRESSION: NOT CHANGED
CLINICAL_PROGRESSION: NOT CHANGED

## 2024-01-28 ASSESSMENT — ACTIVITIES OF DAILY LIVING (ADL)
LACK_OF_TRANSPORTATION: NO
LACK_OF_TRANSPORTATION: NO

## 2024-01-28 ASSESSMENT — LIFESTYLE VARIABLES
HOW MANY STANDARD DRINKS CONTAINING ALCOHOL DO YOU HAVE ON A TYPICAL DAY: PATIENT DOES NOT DRINK
HOW OFTEN DO YOU HAVE 6 OR MORE DRINKS ON ONE OCCASION: NEVER
SKIP TO QUESTIONS 9-10: 1
HOW OFTEN DO YOU HAVE A DRINK CONTAINING ALCOHOL: NEVER
AUDIT-C TOTAL SCORE: 0
AUDIT-C TOTAL SCORE: 0

## 2024-01-28 ASSESSMENT — COLUMBIA-SUICIDE SEVERITY RATING SCALE - C-SSRS
6. HAVE YOU EVER DONE ANYTHING, STARTED TO DO ANYTHING, OR PREPARED TO DO ANYTHING TO END YOUR LIFE?: NO
1. IN THE PAST MONTH, HAVE YOU WISHED YOU WERE DEAD OR WISHED YOU COULD GO TO SLEEP AND NOT WAKE UP?: NO
2. HAVE YOU ACTUALLY HAD ANY THOUGHTS OF KILLING YOURSELF?: NO

## 2024-01-28 ASSESSMENT — PAIN DESCRIPTION - FREQUENCY
FREQUENCY: CONSTANT/CONTINUOUS
FREQUENCY: CONSTANT/CONTINUOUS

## 2024-01-28 NOTE — H&P
Obstetrical Admission History and Physical     Thang Holland is a 33 y.o. .    Chief Complaint: Vaginal Bleeding    Assessment/Plan    32yo @ 30w4d by LMP c/w 18wk US presenting as transfer for VB.    Vaginal bleeding, c/f abruption  - One episode of bright red bleeding today, now largely resolved. Most likely due to abruption. No placenta previa seen on prior scans or BSUS today.  - At OSH, found to have Hb 8.2. Repeat CBC, coags, fibrinogen ordered on admission. CBC resulted with Hb 7.6. Remainder of labs pending. Suspect dilution due to administration of IVF, will repeat CBC.  - T&C x2u pRBC  - Rh pos, no rhogam indicated  - Admitted to L&D and consented. Will continue to monitor bleeding on L&D, and will fu labs. If bleeding stabilizes, will transfer to antepartum service. Discussed with patient that because bleeding is stable at this time, BMZ does not have to be administered, however if bleeding worsens will readdress    Anemia  - Anemic on admission as above, in addition to known anemia: prior Hb 9.5 on  with ferritin 23  - HB ID: HbA 57%, Hb C 38% c/w HbC trait    Fetal status  - For cEFM. On admission, 1-2 possible spontaneous decels, however with mod variability and accels present  - BSUS: EFW 1309g 5%, cephalic  - Last formal US  anatomy ultrasound- no abnormalities identified    Routine  - GBS collected on admission  - TDAP: will offer  - Flu   - 1hr 104 on   - BCM: will address    Dispo: Admit to L&D    Pt seen and discussed with Dr. Irving and Dr. Chandler Barclay MD   [facility]  Pager [phone]     Principal Problem:    Labor and delivery indication for care or intervention             Subjective   Pt presented to OSH earlier today for VB. States she was watching TV at home, felt a gush of fluid and noted bright red blood with wiping. Then soaked through panty liner. Bleeding had slowed down by the time of presentation to the OSH and now patient reports no bleeding. Denies  abd trauma. Reports slight cramping. Denies LOF. Reports GFM.    At [facility], BSUS showed . Cephalic. Vitals stable. Labs n/f: Hb 8.2, MCV 76, plt 241. CMP wnl. UA with + blood, leuks. Blood type B pos.      Obstetrical History   OB History    Para Term  AB Living   1 0 0 0 0 0   SAB IAB Ectopic Multiple Live Births   0 0 0 0 0      # Outcome Date GA Lbr Kevin/2nd Weight Sex Delivery Anes PTL Lv   1 Current                Past Medical History  No past medical history on file.     Past Surgical History   Past Surgical History:   Procedure Laterality Date    OTHER SURGICAL HISTORY  2020    Spinal surgery   Spinal fusion for scoliosis    Social History  Social History     Tobacco Use    Smoking status: Never    Smokeless tobacco: Never   Substance Use Topics    Alcohol use: Never     Substance and Sexual Activity   Drug Use Never       Allergies  Patient has no known allergies.     Medications  Medications Prior to Admission   Medication Sig Dispense Refill Last Dose    aspirin 81 mg chewable tablet CHEW 2 TABLETS (162 MG) ONCE DAILY. 180 tablet 4     ergocalciferol (Vitamin D-2) 1.25 MG (97191 UT) capsule Take 1 capsule (1,250 mcg) by mouth 1 (one) time per week.       ferrous sulfate 325 (65 Fe) MG EC tablet Take 1 tablet by mouth 2 times a day with meals. Do not crush, chew, or split. 60 tablet 11     prenatal no115/iron/folic acid (PRENATAL 19 ORAL) Take by mouth.          Objective    Last Vitals  Temp Pulse Resp BP MAP O2 Sat   36.9 °C (98.4 °F) 72 16 123/73   98 %     Physical Examination  Constitutional: No visible distress, alert and cooperative  Respiratory/Thorax: Normal respiratory effort on RA  Cardiovascular: Reg rate  Gastrointestinal: soft, nondistended, nontender, gravid  Neurological: A&Ox3  Psychological: Appropriate mood and behavior    Cervical Exam  Dilation: Fingertip  Effacement (%): 30  Fetal Station: -3  Method: Manual  OB Examiner: Aristides GUSTAFSON  Fetal  Assessment  Movement: Present  Mode: External US  Baseline Fetal Heart Rate (bpm): 155 bpm  Baseline Classification: Normal  Variability: Moderate (Between 6 and 25 BPM)  Pattern: Accelerations      Contraction Frequency: irregular    SSE: <5cc of dark red blood, no active bleeding from cervical os     BSUS (performed by Dr. Kelley): 1309g 5% EFW, cephalic    Lab Review   01/28/24 00:45   ANTIBODY SCREEN NEG   ABO TYPE B   Rh Type POS      Latest Reference Range & Units 01/28/24 00:45   GLUCOSE 65 - 99 mg/dL 80   SODIUM 133 - 145 mmol/L 135   POTASSIUM 3.4 - 5.1 mmol/L 3.9   CHLORIDE 97 - 107 mmol/L 104   Bicarbonate 24 - 31 mmol/L 19 (L)   Anion Gap <=19 mmol/L 12   Blood Urea Nitrogen 8 - 25 mg/dL 11   Creatinine 0.40 - 1.60 mg/dL 0.70   EGFR >60 mL/min/1.73m*2 >90   Calcium 8.5 - 10.4 mg/dL 9.5   Albumin 3.5 - 5.0 g/dL 3.1 (L)   Alkaline Phosphatase 35 - 125 U/L 159 (H)   ALT 5 - 40 U/L 16   AST 5 - 40 U/L 19   Bilirubin Total 0.1 - 1.2 mg/dL <0.2   (L): Data is abnormally low  (H): Data is abnormally high     Latest Reference Range & Units 01/28/24 00:45   WBC 4.4 - 11.3 x10*3/uL 12.0 (H)   nRBC 0.0 - 0.0 /100 WBCs 0.0   RBC 4.00 - 5.20 x10*6/uL 3.05 (L)   HEMOGLOBIN 12.0 - 16.0 g/dL 8.2 (L)   HEMATOCRIT 36.0 - 46.0 % 23.3 (L)   MCV 80 - 100 fL 76 (L)   MCH 26.0 - 34.0 pg 26.9   MCHC 32.0 - 36.0 g/dL 35.2   RED CELL DISTRIBUTION WIDTH 11.5 - 14.5 % 13.2   Platelets 150 - 450 x10*3/uL 241   (H): Data is abnormally high  (L): Data is abnormally low     Latest Reference Range & Units 01/28/24 00:46   Color, Urine Light-Yellow, Yellow, Dark-Yellow  Colorless !   Appearance, Urine Clear  Clear   Specific Gravity, Urine 1.005 - 1.035  1.005   pH, Urine 5.0, 5.5, 6.0, 6.5, 7.0, 7.5, 8.0  6.0   Protein, Urine NEGATIVE, 10 (TRACE), 20 (TRACE) mg/dL NEGATIVE   Glucose, Urine Normal mg/dL Normal   Blood, Urine NEGATIVE  OVER (3+) !   Ketones, Urine NEGATIVE mg/dL NEGATIVE   Bilirubin, Urine NEGATIVE  NEGATIVE    Urobilinogen, Urine Normal mg/dL Normal   Nitrite, Urine NEGATIVE  NEGATIVE   Leukocyte Esterase, Urine NEGATIVE  25 Rasheeda/µL !   !: Data is abnormal

## 2024-01-28 NOTE — SIGNIFICANT EVENT
At bedside for recheck. Patient denies having abdominal pain or discrete cx, only very mild cramping. Had bright red spotting when she wiped after using the bathroom.     Cervical Exam  Dilation: Fingertip  Effacement (%): 30  Fetal Station: -3  Method: Manual  OB Examiner: Aristides GUSTAFSON  Fetal Assessment  Movement: Present  Mode: External US  Baseline Fetal Heart Rate (bpm): 140 bpm  Baseline Classification: Normal  Variability: Moderate (Between 6 and 25 BPM)  Pattern: Accelerations  FHR Category: Category II  Multiple Births:  (patient up to bathroom)      Contraction Frequency: 2-7    VB, c/f abruption  - Now with continued, small amt of bleeding  - Though cervical exam unchanged, discussed that with continued bleeding + regular contraction pattern, would recommend course of BMZ now. BMZ#1 given 1/28, for second dose in 24hrs.   - Repeat abruption labs collected and pending.   - CEFM, cat 1 currently. [place] q 2-5 mins.     D/w Dr. Chandler Irving MD PGY-4  Obstetrics and Gynecology

## 2024-01-28 NOTE — CONSULTS
Maternal-fetal medicine consult     Thang Holland is a 33 y.o. .    Chief Complaint: Vaginal Bleeding    Assessment/Plan    34yo @ 30w4d by LMP c/w 18wk US presenting as transfer for VB.    Vaginal bleeding, c/f abruption  - One episode of bright red bleeding today, now largely resolved. Most likely due to abruption. No placenta previa seen on prior scans or BSUS today.  - At OSH, found to have Hb 8.2. Lab Trend this admission         Hb 7.6->7.7           Coags and fibrinogen wnl x 2  - T&C x2u pRBC  - Rh pos, no rhogam indicated  - Admitted to L&D and consented. Will continue to monitor bleeding on L&D, and will fu labs. If bleeding stabilizes, will transfer to antepartum service.   - BMZ x 1 given, for next dose in 24 hours if still pregnant   -Patient with breakthrough bleeding at 0600 this AM with some intermittent cramping. Will plan to monitor on L&D through the morning with plan for transfer to [facility] once symptoms resolved and without ongoing bleeding.    Anemia  - Anemic on admission as above, in addition to known anemia: prior Hb 9.5 on  with ferritin 23  - HB ID: HbA 57%, Hb C 38% c/w HbC trait  - PO Iron ordered.    Fetal status  - For cEFM. On admission, 1-2 possible spontaneous decels, however with mod variability and accels present  - BSUS: EFW 1309g 5%, cephalic  - Last formal US  anatomy ultrasound- no abnormalities identified  - For formal growth US on     Routine  - GBS collected on admission  - TDAP: will offer  - Flu   - 1hr 104 on   - BCM: will address    Dispo: Admit to L&D for monitoring of VB.     Pt seen and d/w Dr. Brenda Pham MD   [facility]  Pager [phone]     Principal Problem:    Labor and delivery indication for care or intervention             Subjective   Interval HPI  Patient feeling okay this AM, notes some streaks on the pad in the bathroom but not passing clots. Reports good FM. Some intermittent cramping. No LOF.      Admission HPI  Pt  presented to OSH earlier today for VB. States she was watching TV at home, felt a gush of fluid and noted bright red blood with wiping. Then soaked through panty liner. Bleeding had slowed down by the time of presentation to the OSH and now patient reports no bleeding. Denies abd trauma. Reports slight cramping. Denies LOF. Reports GFM.    At [facility], BSUS showed . Cephalic. Vitals stable. Labs n/f: Hb 8.2, MCV 76, plt 241. CMP wnl. UA with + blood, leuks. Blood type B pos.      Obstetrical History   OB History    Para Term  AB Living   1 0 0 0 0 0   SAB IAB Ectopic Multiple Live Births   0 0 0 0 0      # Outcome Date GA Lbr Kevin/2nd Weight Sex Delivery Anes PTL Lv   1 Current                Past Medical History  No past medical history on file.     Past Surgical History   Past Surgical History:   Procedure Laterality Date    OTHER SURGICAL HISTORY  2020    Spinal surgery   Spinal fusion for scoliosis    Social History  Social History     Tobacco Use    Smoking status: Never    Smokeless tobacco: Never   Substance Use Topics    Alcohol use: Never     Substance and Sexual Activity   Drug Use Never       Allergies  Patient has no known allergies.     Medications  Medications Prior to Admission   Medication Sig Dispense Refill Last Dose    aspirin 81 mg chewable tablet CHEW 2 TABLETS (162 MG) ONCE DAILY. 180 tablet 4     ergocalciferol (Vitamin D-2) 1.25 MG (54848 UT) capsule Take 1 capsule (1,250 mcg) by mouth 1 (one) time per week.       ferrous sulfate 325 (65 Fe) MG EC tablet Take 1 tablet by mouth 2 times a day with meals. Do not crush, chew, or split. 60 tablet 11     prenatal no115/iron/folic acid (PRENATAL 19 ORAL) Take by mouth.          Objective    Last Vitals  Temp Pulse Resp BP MAP O2 Sat   36.8 °C (98.2 °F) 70 16 133/72   100 %     Physical Examination  Constitutional: No visible distress, alert and cooperative  Respiratory/Thorax: Normal respiratory effort on RA  Cardiovascular:  Reg rate  Gastrointestinal: soft, nondistended, nontender, gravid  Neurological: A&Ox3  Psychological: Appropriate mood and behavior    Cervical Exam  Dilation: Fingertip  Effacement (%): 30  Fetal Station: -3  Method: Manual  OB Examiner: MD Pham  Fetal Assessment  Movement: Present  Mode: External US  Baseline Fetal Heart Rate (bpm): 125 bpm  Baseline Classification: Normal  Variability: Moderate (Between 6 and 25 BPM)  Pattern: Accelerations  FHR Category: Category II  Multiple Births:  (patient up to bathroom)      Contraction Frequency: 2-5    SSE: <5cc of dark red blood, no active bleeding from cervical os     BSUS (performed by Dr. Kelley): 1309g 5% EFW, cephalic    Lab Review   01/28/24 00:45   ANTIBODY SCREEN NEG   ABO TYPE B   Rh Type POS      Latest Reference Range & Units 01/28/24 00:45   GLUCOSE 65 - 99 mg/dL 80   SODIUM 133 - 145 mmol/L 135   POTASSIUM 3.4 - 5.1 mmol/L 3.9   CHLORIDE 97 - 107 mmol/L 104   Bicarbonate 24 - 31 mmol/L 19 (L)   Anion Gap <=19 mmol/L 12   Blood Urea Nitrogen 8 - 25 mg/dL 11   Creatinine 0.40 - 1.60 mg/dL 0.70   EGFR >60 mL/min/1.73m*2 >90   Calcium 8.5 - 10.4 mg/dL 9.5   Albumin 3.5 - 5.0 g/dL 3.1 (L)   Alkaline Phosphatase 35 - 125 U/L 159 (H)   ALT 5 - 40 U/L 16   AST 5 - 40 U/L 19   Bilirubin Total 0.1 - 1.2 mg/dL <0.2   (L): Data is abnormally low  (H): Data is abnormally high     Latest Reference Range & Units 01/28/24 00:45   WBC 4.4 - 11.3 x10*3/uL 12.0 (H)   nRBC 0.0 - 0.0 /100 WBCs 0.0   RBC 4.00 - 5.20 x10*6/uL 3.05 (L)   HEMOGLOBIN 12.0 - 16.0 g/dL 8.2 (L)   HEMATOCRIT 36.0 - 46.0 % 23.3 (L)   MCV 80 - 100 fL 76 (L)   MCH 26.0 - 34.0 pg 26.9   MCHC 32.0 - 36.0 g/dL 35.2   RED CELL DISTRIBUTION WIDTH 11.5 - 14.5 % 13.2   Platelets 150 - 450 x10*3/uL 241   (H): Data is abnormally high  (L): Data is abnormally low     Latest Reference Range & Units 01/28/24 00:46   Color, Urine Light-Yellow, Yellow, Dark-Yellow  Colorless !   Appearance, Urine Clear  Clear    Specific Gravity, Urine 1.005 - 1.035  1.005   pH, Urine 5.0, 5.5, 6.0, 6.5, 7.0, 7.5, 8.0  6.0   Protein, Urine NEGATIVE, 10 (TRACE), 20 (TRACE) mg/dL NEGATIVE   Glucose, Urine Normal mg/dL Normal   Blood, Urine NEGATIVE  OVER (3+) !   Ketones, Urine NEGATIVE mg/dL NEGATIVE   Bilirubin, Urine NEGATIVE  NEGATIVE   Urobilinogen, Urine Normal mg/dL Normal   Nitrite, Urine NEGATIVE  NEGATIVE   Leukocyte Esterase, Urine NEGATIVE  25 Rasheeda/µL !   !: Data is abnormal

## 2024-01-28 NOTE — SIGNIFICANT EVENT
House officer to bedside to evaluate patient after additional dark brown blood on pad while up to the restroom. Patient also lela more frequently on the monitor at this time ever 5-6 min with irritability between ctx. Patient continues to note good FM, denies LOF.      FHT: Cat 1  [place]: Ctx q5-6min  SSE: 5cc dark brown clot in vaginal vault, cervix visually closed without bright red blood from the cervical os  SVE: 0.5/40/-3     -CEFM on L&D to monitor for ctx spacing  -s/p BMZ #1 on admission  -Plan for transfer to [facility] pending resolution of ctx           Results for orders placed or performed during the hospital encounter of 01/27/24 (from the past 24 hour(s))   POCT GLUCOSE   Result Value Ref Range     POCT Glucose 129 (H) 74 - 99 mg/dL   POCT GLUCOSE   Result Value Ref Range     POCT Glucose 125 (H) 74 - 99 mg/dL   CBC   Result Value Ref Range     WBC 11.8 (H) 4.4 - 11.3 x10*3/uL     nRBC 0.0 0.0 - 0.0 /100 WBCs     RBC 5.30 (H) 4.00 - 5.20 x10*6/uL     Hemoglobin 13.0 12.0 - 16.0 g/dL     Hematocrit 41.3 36.0 - 46.0 %     MCV 78 (L) 80 - 100 fL     MCH 24.5 (L) 26.0 - 34.0 pg     MCHC 31.5 (L) 32.0 - 36.0 g/dL     RDW 14.2 11.5 - 14.5 %     Platelets 267 150 - 450 x10*3/uL   BLOOD GAS VENOUS FULL PANEL   Result Value Ref Range     POCT pH, Venous 7.43 7.33 - 7.43 pH     POCT pCO2, Venous 32 (L) 41 - 51 mm Hg     POCT pO2, Venous 89 (H) 35 - 45 mm Hg     POCT SO2, Venous 99 (H) 45 - 75 %     POCT Oxy Hemoglobin, Venous 96.6 (H) 45.0 - 75.0 %     POCT Hematocrit Calculated, Venous 40.0 36.0 - 46.0 %     POCT Sodium, Venous 137 136 - 145 mmol/L     POCT Potassium, Venous 3.0 (L) 3.5 - 5.3 mmol/L     POCT Chloride, Venous 106 98 - 107 mmol/L     POCT Ionized Calicum, Venous 1.15 1.10 - 1.33 mmol/L     POCT Glucose, Venous 125 (H) 74 - 99 mg/dL     POCT Lactate, Venous 2.2 (H) 0.4 - 2.0 mmol/L     POCT Base Excess, Venous -2.3 (L) -2.0 - 3.0 mmol/L     POCT HCO3 Calculated, Venous 21.2 (L) 22.0 - 26.0 mmol/L      POCT Hemoglobin, Venous 13.4 12.0 - 16.0 g/dL     POCT Anion Gap, Venous 13.0 10.0 - 25.0 mmol/L     Patient Temperature 37.0 degrees Celsius     FiO2 21 %   Beta Hydroxybutyrate   Result Value Ref Range     Beta-Hydroxybutyrate 0.75 (H) 0.02 - 0.27 mmol/L   Comprehensive metabolic panel   Result Value Ref Range     Glucose 121 (H) 74 - 99 mg/dL     Sodium 140 136 - 145 mmol/L     Potassium 4.6 3.5 - 5.3 mmol/L     Chloride 104 98 - 107 mmol/L     Bicarbonate 22 21 - 32 mmol/L     Anion Gap 19 10 - 20 mmol/L     Urea Nitrogen 5 (L) 6 - 23 mg/dL     Creatinine 0.56 0.50 - 1.05 mg/dL     eGFR >90 >60 mL/min/1.73m*2     Calcium 9.5 8.6 - 10.6 mg/dL     Albumin 3.9 3.4 - 5.0 g/dL     Alkaline Phosphatase 114 (H) 33 - 110 U/L     Total Protein 7.6 6.4 - 8.2 g/dL     AST 31 9 - 39 U/L     Bilirubin, Total 0.7 0.0 - 1.2 mg/dL     ALT 10 7 - 45 U/L   Type and screen   Result Value Ref Range     ABO TYPE O       Rh TYPE POS       ANTIBODY SCREEN NEG     Sars-CoV-2 and Influenza A/B PCR   Result Value Ref Range     Flu A Result Not Detected Not Detected     Flu B Result Not Detected Not Detected     Coronavirus 2019, PCR Not Detected Not Detected   Syphillis Screen, with reflex VDRL   Result Value Ref Range     Syphilis Total Ab Nonreactive Nonreactive   POCT urinalysis dipstick   Result Value Ref Range     Color, UA Lynnette       Clarity, UA Clear       Glucose, UA         Bilirubin, UA 1+       Ketones, UA Positive       Spec Grav, UA 1.030       Blood, UA Positive       pH, UA 5.5       Protein, UA (3+) Moderate       Urobilinogen, UA 1.0       Leukocytes, UA NEGATIVE       Nitrite, UA NEGATIVE       Appearance, Fluid       POCT GLUCOSE   Result Value Ref Range     POCT Glucose 153 (H) 74 - 99 mg/dL   Blood Gas Lactic Acid, Venous   Result Value Ref Range     POCT Lactate, Venous 2.9 (H) 0.4 - 2.0 mmol/L   POCT GLUCOSE   Result Value Ref Range     POCT Glucose 135 (H) 74 - 99 mg/dL   POCT GLUCOSE   Result Value Ref Range      POCT Glucose 145 (H) 74 - 99 mg/dL   POCT GLUCOSE   Result Value Ref Range     POCT Glucose 143 (H) 74 - 99 mg/dL         Bandar Pham MD PGY-3

## 2024-01-28 NOTE — ED PROVIDER NOTES
HPI   Chief Complaint   Patient presents with    Vaginal Bleeding - Pregnant     Pt stated that she noticed that she was bleeding around 15 mins ago pain is 2/10 describes it as a cramping feeling.       Patient presents the emergency room today with complaints of having vaginal bleeding has been going on for the last approximate 4 hours.  The patient states that she has gone through approxione pad.  Patient states that she is 30 weeks pregnant, and this is her first pregnancy.  Patient states that she has had no problems prenatally with this pregnancy.  Patient denies any chest pain.  Patient denies shortness of breath.  Patient denies nausea or vomiting.  Patient states there is no gush of fluids.  The patient denies any back pain or discharge with this pregnancy as well.  Patient states that she came to this facility, but has been seeing Dr. Sigala with the  group over the course of the last 8 months.  Patient denies any other associated symptomatology.  Patient denies any trauma.        Christine Coma Scale Score: 15                  Patient History   History reviewed. No pertinent past medical history.  Past Surgical History:   Procedure Laterality Date    OTHER SURGICAL HISTORY  09/21/2020    Spinal surgery     Family History   Problem Relation Name Age of Onset    Other (malignant neoplasm of breast) Mother      Diabetes Father      Other (nephrectomy) Father      Other (malignant neoplasm of breast) Mother's Sister      Other (malignant neoplasm of breast) Maternal Grandmother       Social History     Tobacco Use    Smoking status: Never    Smokeless tobacco: Never   Vaping Use    Vaping Use: Never used   Substance Use Topics    Alcohol use: Never    Drug use: Never       Physical Exam   ED Triage Vitals [01/28/24 0016]   Temperature Heart Rate Respirations BP   36.7 °C (98.1 °F) 92 18 139/85      Pulse Ox Temp Source Heart Rate Source Patient Position   100 % Tympanic Monitor --      BP Location FiO2 (%)      Left arm --       Physical Exam  Vitals and nursing note reviewed.   Constitutional:       General: She is not in acute distress.     Appearance: She is well-developed.   HENT:      Head: Normocephalic and atraumatic.   Eyes:      Conjunctiva/sclera: Conjunctivae normal.   Cardiovascular:      Rate and Rhythm: Normal rate and regular rhythm.      Heart sounds: No murmur heard.  Pulmonary:      Effort: Pulmonary effort is normal. No respiratory distress.      Breath sounds: Normal breath sounds.   Abdominal:      Palpations: Abdomen is soft.      Tenderness: There is no abdominal tenderness.      Comments: Gravid uterus noted   Musculoskeletal:         General: No swelling.      Cervical back: Neck supple.   Skin:     General: Skin is warm and dry.      Capillary Refill: Capillary refill takes less than 2 seconds.   Neurological:      Mental Status: She is alert.   Psychiatric:         Mood and Affect: Mood normal.         ED Course & MDM   ED Course as of 01/28/24 0110   Sun Jan 28, 2024   0059 I did speak with the obstetrician on-call, Dr. Kelley, and she was willing to accept the patient in transfer.  I did make her aware the fact the patient does have a hemoglobin 8.2, which is down slightly from 12 days ago. [FR]      ED Course User Index  [FR] Corey Hood DO         Diagnoses as of 01/28/24 0110   Vaginal bleeding in pregnancy, third trimester       Medical Decision Making  After my initial evaluation, the patient did have a bedside ultrasound done which shows a heart rate of approximately 155 bpm.  There is not appear to be any active bleeding.  Patient is presenting headfirst, but the head does not appear to be engaged.  I am going to contact the transfer center, and arrange for the patient to be transferred to [facility] for further evaluation        Procedure  Procedures     Corey Hood DO  01/28/24 0110

## 2024-01-28 NOTE — ED TRIAGE NOTES
0010- PA ciro made aware of pt while in triage with request by this nurse for transfer.  Pt to go to T1 for assessment

## 2024-01-28 NOTE — ED NOTES
0025-md aware of pt in t1, request for immediate transfer.  Delivery care and  supplies set up in room.       Lindsay Fernandes RN  24 0048

## 2024-01-28 NOTE — CARE PLAN
The clinical goals for the shift include minimal to no VB    Patient had brown spotting this shift. FHR tracing was reviewed with Mds this shift. IV iron given this shift. Patient stable and transferred to [facility].    Silva Blake RN

## 2024-01-28 NOTE — SIGNIFICANT EVENT
Bedside US performed with the following findings    EFW 1309g, 5th percentile for gestational age  BPD 7.44 cm  HC 26.22 cm  AC 25.06 cm  FL 5.35 cm    Placenta anterior, maternal left. No gross evidence of abruption or hematoma identified. Placental edge away from internal os--no previa identified.  Cephalic presentation  DELANEY 15cm  BPP 8/8    Jaki Arteaga MD

## 2024-01-29 ENCOUNTER — APPOINTMENT (OUTPATIENT)
Dept: RADIOLOGY | Facility: HOSPITAL | Age: 34
DRG: 833 | End: 2024-01-29
Payer: COMMERCIAL

## 2024-01-29 ENCOUNTER — APPOINTMENT (OUTPATIENT)
Dept: OBSTETRICS AND GYNECOLOGY | Facility: CLINIC | Age: 34
End: 2024-01-29
Payer: COMMERCIAL

## 2024-01-29 PROBLEM — O35.EXX0 PYELECTASIS OF FETUS ON PRENATAL ULTRASOUND (HHS-HCC): Status: ACTIVE | Noted: 2024-01-29

## 2024-01-29 PROBLEM — O99.013 ANEMIA COMPLICATING PREGNANCY, THIRD TRIMESTER (HHS-HCC): Status: ACTIVE | Noted: 2024-01-29

## 2024-01-29 PROBLEM — O36.5930 POOR FETAL GROWTH AFFECTING MANAGEMENT OF MOTHER IN THIRD TRIMESTER (HHS-HCC): Status: ACTIVE | Noted: 2024-01-29

## 2024-01-29 PROBLEM — O45.93 PLACENTAL ABRUPTION IN THIRD TRIMESTER (HHS-HCC): Status: ACTIVE | Noted: 2024-01-29

## 2024-01-29 PROCEDURE — 76818 FETAL BIOPHYS PROFILE W/NST: CPT

## 2024-01-29 PROCEDURE — 2500000001 HC RX 250 WO HCPCS SELF ADMINISTERED DRUGS (ALT 637 FOR MEDICARE OP): Performed by: STUDENT IN AN ORGANIZED HEALTH CARE EDUCATION/TRAINING PROGRAM

## 2024-01-29 PROCEDURE — 76816 OB US FOLLOW-UP PER FETUS: CPT

## 2024-01-29 PROCEDURE — 76816 OB US FOLLOW-UP PER FETUS: CPT | Performed by: OBSTETRICS & GYNECOLOGY

## 2024-01-29 PROCEDURE — 76818 FETAL BIOPHYS PROFILE W/NST: CPT | Performed by: OBSTETRICS & GYNECOLOGY

## 2024-01-29 PROCEDURE — 76820 UMBILICAL ARTERY ECHO: CPT

## 2024-01-29 PROCEDURE — 2500000004 HC RX 250 GENERAL PHARMACY W/ HCPCS (ALT 636 FOR OP/ED): Performed by: STUDENT IN AN ORGANIZED HEALTH CARE EDUCATION/TRAINING PROGRAM

## 2024-01-29 PROCEDURE — 1220000001 HC OB SEMI-PRIVATE ROOM DAILY

## 2024-01-29 PROCEDURE — 2500000004 HC RX 250 GENERAL PHARMACY W/ HCPCS (ALT 636 FOR OP/ED)

## 2024-01-29 PROCEDURE — 99232 SBSQ HOSP IP/OBS MODERATE 35: CPT | Performed by: STUDENT IN AN ORGANIZED HEALTH CARE EDUCATION/TRAINING PROGRAM

## 2024-01-29 PROCEDURE — 76820 UMBILICAL ARTERY ECHO: CPT | Performed by: OBSTETRICS & GYNECOLOGY

## 2024-01-29 RX ORDER — ACETAMINOPHEN 325 MG/1
650 TABLET ORAL EVERY 6 HOURS PRN
Status: DISCONTINUED | OUTPATIENT
Start: 2024-01-29 | End: 2024-01-31 | Stop reason: HOSPADM

## 2024-01-29 RX ORDER — DIPHENHYDRAMINE HYDROCHLORIDE 50 MG/ML
25 INJECTION INTRAMUSCULAR; INTRAVENOUS ONCE
Status: COMPLETED | OUTPATIENT
Start: 2024-01-29 | End: 2024-01-29

## 2024-01-29 RX ADMIN — ACETAMINOPHEN 650 MG: 325 TABLET ORAL at 19:52

## 2024-01-29 RX ADMIN — PRENATAL VIT W/ FE FUMARATE-FA TAB 27-0.8 MG 1 TABLET: 27-0.8 TAB at 08:49

## 2024-01-29 RX ADMIN — ACETAMINOPHEN 650 MG: 325 TABLET ORAL at 12:33

## 2024-01-29 RX ADMIN — DIPHENHYDRAMINE HYDROCHLORIDE 25 MG: 50 INJECTION INTRAMUSCULAR; INTRAVENOUS at 22:01

## 2024-01-29 RX ADMIN — FERROUS SULFATE TAB 325 MG (65 MG ELEMENTAL FE) 1 TABLET: 325 (65 FE) TAB at 08:49

## 2024-01-29 RX ADMIN — BETAMETHASONE ACETATE AND BETAMETHASONE SODIUM PHOSPHATE 12 MG: 3; 3 INJECTION, SUSPENSION INTRA-ARTICULAR; INTRALESIONAL; INTRAMUSCULAR; SOFT TISSUE at 06:30

## 2024-01-29 RX ADMIN — METOCLOPRAMIDE 10 MG: 10 TABLET ORAL at 17:00

## 2024-01-29 RX ADMIN — IRON SUCROSE 300 MG: 20 INJECTION, SOLUTION INTRAVENOUS at 12:34

## 2024-01-29 ASSESSMENT — PAIN DESCRIPTION - DESCRIPTORS
DESCRIPTORS: HEADACHE

## 2024-01-29 ASSESSMENT — PAIN - FUNCTIONAL ASSESSMENT
PAIN_FUNCTIONAL_ASSESSMENT: 0-10

## 2024-01-29 ASSESSMENT — PAIN SCALES - GENERAL
PAINLEVEL_OUTOF10: 3
PAINLEVEL_OUTOF10: 8
PAINLEVEL_OUTOF10: 5 - MODERATE PAIN
PAINLEVEL_OUTOF10: 7
PAINLEVEL_OUTOF10: 0 - NO PAIN

## 2024-01-29 NOTE — CARE PLAN
VSS t/o shift. Pt having intermittent ctx's, no active bleeding (pads show scant brown blood), and no LOF. FHR WNL during spot checks.       Problem: Antepartum  Goal: Maintain pregnancy as long as maternal and/or fetal condition is stable  Outcome: Progressing  Goal: FHR remains reassuring  Outcome: Progressing     Problem: Pain - Adult  Goal: Verbalizes/displays adequate comfort level or baseline comfort level  Outcome: Progressing     Problem: Safety - Adult  Goal: Free from fall injury  Outcome: Progressing     Problem: Anemia in pregnancy  Goal: Tolerates treatment for anemia  Outcome: Progressing     Problem: UH VAGINAL BLEEDING/HEMORRHAGE AP  Goal: Fewer then 4-6 ct per hour  Outcome: Progressing  Goal: No s/sx of hemorrhage  Outcome: Progressing

## 2024-01-29 NOTE — CONSULTS
ANTEPARTUM PROGRESS NOTE    Subjective   Patient feeling okay this AM, denies VB overnight. Some mild cramping but reports good FM. Slept well overnight.    Objective    Last Vitals  Temp Pulse Resp BP MAP O2 Sat   37.3 °C (99.1 °F) 83 18 108/64   100 %     Physical Examination  Constitutional: No visible distress, alert and cooperative  Respiratory/Thorax: Normal respiratory effort on RA  Cardiovascular: Reg rate  Gastrointestinal: soft, nondistended, nontender, gravid  Neurological: A&Ox3  Psychological: Appropriate mood and behavior     Contraction Frequency: occassional/intermittent  NST: 130/mod/+accel/-decel   [place]: Irritable      Lab Review   01/28/24 00:45   ANTIBODY SCREEN NEG   ABO TYPE B   Rh Type POS      Latest Reference Range & Units 01/28/24 00:45   GLUCOSE 65 - 99 mg/dL 80   SODIUM 133 - 145 mmol/L 135   POTASSIUM 3.4 - 5.1 mmol/L 3.9   CHLORIDE 97 - 107 mmol/L 104   Bicarbonate 24 - 31 mmol/L 19 (L)   Anion Gap <=19 mmol/L 12   Blood Urea Nitrogen 8 - 25 mg/dL 11   Creatinine 0.40 - 1.60 mg/dL 0.70   EGFR >60 mL/min/1.73m*2 >90   Calcium 8.5 - 10.4 mg/dL 9.5   Albumin 3.5 - 5.0 g/dL 3.1 (L)   Alkaline Phosphatase 35 - 125 U/L 159 (H)   ALT 5 - 40 U/L 16   AST 5 - 40 U/L 19   Bilirubin Total 0.1 - 1.2 mg/dL <0.2   (L): Data is abnormally low  (H): Data is abnormally high     Latest Reference Range & Units 01/28/24 00:45   WBC 4.4 - 11.3 x10*3/uL 12.0 (H)   nRBC 0.0 - 0.0 /100 WBCs 0.0   RBC 4.00 - 5.20 x10*6/uL 3.05 (L)   HEMOGLOBIN 12.0 - 16.0 g/dL 8.2 (L)   HEMATOCRIT 36.0 - 46.0 % 23.3 (L)   MCV 80 - 100 fL 76 (L)   MCH 26.0 - 34.0 pg 26.9   MCHC 32.0 - 36.0 g/dL 35.2   RED CELL DISTRIBUTION WIDTH 11.5 - 14.5 % 13.2   Platelets 150 - 450 x10*3/uL 241   (H): Data is abnormally high  (L): Data is abnormally low     Latest Reference Range & Units 01/28/24 00:46   Color, Urine Light-Yellow, Yellow, Dark-Yellow  Colorless !   Appearance, Urine Clear  Clear   Specific Gravity, Urine 1.005 - 1.035  1.005    pH, Urine 5.0, 5.5, 6.0, 6.5, 7.0, 7.5, 8.0  6.0   Protein, Urine NEGATIVE, 10 (TRACE), 20 (TRACE) mg/dL NEGATIVE   Glucose, Urine Normal mg/dL Normal   Blood, Urine NEGATIVE  OVER (3+) !   Ketones, Urine NEGATIVE mg/dL NEGATIVE   Bilirubin, Urine NEGATIVE  NEGATIVE   Urobilinogen, Urine Normal mg/dL Normal   Nitrite, Urine NEGATIVE  NEGATIVE   Leukocyte Esterase, Urine NEGATIVE  25 [place]/µL !   !: Data is abnormal       Thang Holland is a 33 y.o. .    Chief Complaint: Vaginal Bleeding    Assessment/Plan    34yo @ 30w5d by LMP c/w 18wk US presenting as transfer for VB.    Vaginal bleeding, c/f abruption  - No bleeding this AM, last bleeding episode  at 0600  - No c/f placenta previa on prior US  - At OSH, found to have Hb 8.2. Lab Trend this admission         Hb 7.6->7.7           Coags and fibrinogen wnl x 2  - T&C x2u pRBC  - Rh pos, no rhogam indicated at this time.  - s/p BMZ -    Anemia  - Anemic on admission as above, in addition to known anemia: prior Hb 9.5 on  with ferritin 23  - HB ID: HbA 57%, Hb C 38% c/w HbC trait  - Daily IV Iron sucrose for 3 days while admitted  - Continue PO iron on discharge     Fetal status  - For cEFM. On admission, 1-2 possible spontaneous decels, however with mod variability and accels present  - Formal US : EFW3%, AC3%, FL <1%, HL <1%, 8/8 BPP. Normal dopplers  - Discussed findings of fetal growth restriction on US with patient today, will plan for frequent monitoring with weekly dopplers to assess fetal wellbeing and q3wk growth US.     IUP  - GBS collected on admission, pending  - TDAP: ordered and given   - Flu vaccine   - 1hr 104 on     Dispo: Continue inpatient care    Pt seen and d/w Dr. Patel Pham MD PGY-3  MF  Pager [phone]                     O

## 2024-01-30 ENCOUNTER — APPOINTMENT (OUTPATIENT)
Dept: RADIOLOGY | Facility: HOSPITAL | Age: 34
DRG: 833 | End: 2024-01-30
Payer: COMMERCIAL

## 2024-01-30 LAB — GP B STREP GENITAL QL CULT: ABNORMAL

## 2024-01-30 PROCEDURE — 76818 FETAL BIOPHYS PROFILE W/NST: CPT

## 2024-01-30 PROCEDURE — 2500000004 HC RX 250 GENERAL PHARMACY W/ HCPCS (ALT 636 FOR OP/ED): Performed by: STUDENT IN AN ORGANIZED HEALTH CARE EDUCATION/TRAINING PROGRAM

## 2024-01-30 PROCEDURE — 76818 FETAL BIOPHYS PROFILE W/NST: CPT | Performed by: OBSTETRICS & GYNECOLOGY

## 2024-01-30 PROCEDURE — 2500000001 HC RX 250 WO HCPCS SELF ADMINISTERED DRUGS (ALT 637 FOR MEDICARE OP)

## 2024-01-30 PROCEDURE — 1220000001 HC OB SEMI-PRIVATE ROOM DAILY

## 2024-01-30 PROCEDURE — 76820 UMBILICAL ARTERY ECHO: CPT | Performed by: OBSTETRICS & GYNECOLOGY

## 2024-01-30 PROCEDURE — 99232 SBSQ HOSP IP/OBS MODERATE 35: CPT

## 2024-01-30 PROCEDURE — 2500000004 HC RX 250 GENERAL PHARMACY W/ HCPCS (ALT 636 FOR OP/ED)

## 2024-01-30 PROCEDURE — 2500000001 HC RX 250 WO HCPCS SELF ADMINISTERED DRUGS (ALT 637 FOR MEDICARE OP): Performed by: STUDENT IN AN ORGANIZED HEALTH CARE EDUCATION/TRAINING PROGRAM

## 2024-01-30 PROCEDURE — 76820 UMBILICAL ARTERY ECHO: CPT

## 2024-01-30 RX ORDER — CAFFEINE 200 MG
200 TABLET ORAL ONCE
Status: COMPLETED | OUTPATIENT
Start: 2024-01-30 | End: 2024-01-30

## 2024-01-30 RX ORDER — DIPHENHYDRAMINE HCL 25 MG
25 CAPSULE ORAL ONCE
Status: COMPLETED | OUTPATIENT
Start: 2024-01-30 | End: 2024-01-30

## 2024-01-30 RX ORDER — CAFFEINE 200 MG
100 TABLET ORAL ONCE
Status: COMPLETED | OUTPATIENT
Start: 2024-01-30 | End: 2024-01-30

## 2024-01-30 RX ORDER — CYCLOBENZAPRINE HCL 10 MG
10 TABLET ORAL ONCE
Status: COMPLETED | OUTPATIENT
Start: 2024-01-30 | End: 2024-01-30

## 2024-01-30 RX ORDER — DIPHENHYDRAMINE HCL 25 MG
25 CAPSULE ORAL EVERY 6 HOURS PRN
Status: DISCONTINUED | OUTPATIENT
Start: 2024-01-30 | End: 2024-01-31 | Stop reason: HOSPADM

## 2024-01-30 RX ORDER — SUMATRIPTAN 50 MG/1
50 TABLET, FILM COATED ORAL ONCE
Status: COMPLETED | OUTPATIENT
Start: 2024-01-30 | End: 2024-01-30

## 2024-01-30 RX ADMIN — ACETAMINOPHEN 650 MG: 325 TABLET ORAL at 12:29

## 2024-01-30 RX ADMIN — PRENATAL VIT W/ FE FUMARATE-FA TAB 27-0.8 MG 1 TABLET: 27-0.8 TAB at 08:26

## 2024-01-30 RX ADMIN — DIPHENHYDRAMINE HYDROCHLORIDE 25 MG: 25 CAPSULE ORAL at 06:22

## 2024-01-30 RX ADMIN — METOCLOPRAMIDE 10 MG: 10 TABLET ORAL at 00:17

## 2024-01-30 RX ADMIN — METOCLOPRAMIDE 10 MG: 10 TABLET ORAL at 12:29

## 2024-01-30 RX ADMIN — SODIUM CHLORIDE, POTASSIUM CHLORIDE, SODIUM LACTATE AND CALCIUM CHLORIDE 500 ML: 600; 310; 30; 20 INJECTION, SOLUTION INTRAVENOUS at 06:22

## 2024-01-30 RX ADMIN — SUMATRIPTAN SUCCINATE 50 MG: 50 TABLET ORAL at 09:23

## 2024-01-30 RX ADMIN — CYCLOBENZAPRINE 10 MG: 10 TABLET, FILM COATED ORAL at 19:45

## 2024-01-30 RX ADMIN — DIPHENHYDRAMINE HYDROCHLORIDE 25 MG: 25 CAPSULE ORAL at 12:29

## 2024-01-30 RX ADMIN — CAFFEINE 100 MG: 200 TABLET ORAL at 19:45

## 2024-01-30 RX ADMIN — CAFFEINE 200 MG: 200 TABLET ORAL at 17:22

## 2024-01-30 RX ADMIN — ACETAMINOPHEN 650 MG: 325 TABLET ORAL at 05:57

## 2024-01-30 RX ADMIN — IRON SUCROSE 300 MG: 20 INJECTION, SOLUTION INTRAVENOUS at 12:29

## 2024-01-30 ASSESSMENT — PAIN SCALES - GENERAL
PAINLEVEL_OUTOF10: 4
PAINLEVEL_OUTOF10: 3
PAINLEVEL_OUTOF10: 2
PAINLEVEL_OUTOF10: 4
PAINLEVEL_OUTOF10: 3
PAINLEVEL_OUTOF10: 5 - MODERATE PAIN
PAINLEVEL_OUTOF10: 3
PAINLEVEL_OUTOF10: 5 - MODERATE PAIN
PAINLEVEL_OUTOF10: 7
PAINLEVEL_OUTOF10: 0 - NO PAIN

## 2024-01-30 ASSESSMENT — PAIN DESCRIPTION - DESCRIPTORS
DESCRIPTORS: HEADACHE

## 2024-01-30 ASSESSMENT — PAIN - FUNCTIONAL ASSESSMENT
PAIN_FUNCTIONAL_ASSESSMENT: 0-10

## 2024-01-30 NOTE — PROGRESS NOTES
ANTEPARTUM PROGRESS NOTE   1/30/2024, 10:41 AM     SUBJECTIVE:  No acute events overnight. Patient has no complaints this morning. Denies VB overnight and this morning. Denies ctx and LOF. Reports normal fetal movement. Does report a HA, treated overnight with tylenol, benadryl, reglan, and IVF bolus - brought it down to 4/10. Imitrex this morning now resolving headache. No hx of migraines. Denies vision changes, CP, SOB, RUQ pain.    OBJECTIVE:   /76   Pulse 80   Temp 36.7 °C (98.1 °F) (Temporal)   Resp 20   Ht 1.524 m (5')   Wt 58.9 kg (129 lb 12.5 oz)   LMP 06/28/2023 (Exact Date)   SpO2 99%   BMI 25.35 kg/m²    Temp  Min: 36.7 °C (98.1 °F)  Max: 37.4 °C (99.3 °F)  Pulse  Min: 76  Max: 92  BP  Min: 104/67  Max: 127/75    Constitutional: No visible distress, alert and cooperative  Respiratory/Thorax: Normal respiratory effort on RA, lungs CTAB  Cardiovascular: RRR  Gastrointestinal: soft, nondistended, nontender, gravid  Neurological: A&Ox3  Psychological: Appropriate mood and behavior     NST: 145/mod cindy/no accels/no decels  [place]: acontractile    Labs:   Lab Results   Component Value Date    WBC 10.4 01/28/2024    HGB 7.7 (L) 01/28/2024    HCT 21.4 (L) 01/28/2024     01/28/2024     Lab Results   Component Value Date    APTT 29 01/28/2024    PROTIME 10.2 01/28/2024    INR 0.9 01/28/2024     Lab Results   Component Value Date    FIBRINOGEN 431 (H) 01/28/2024       ASSESSMENT AND PLAN:     34yo @ 30w6d by LMP c/w 18wk US admitted as transfer for VB.     Vaginal bleeding, c/f abruption  - Last bleeding episode 1/28 at 0600  - No c/f placenta previa on prior US  - At OSH, found to have Hb 8.2. Lab Trend this admission         Hb 7.6->7.7           Coags and fibrinogen wnl x 2  - T&C x2u pRBC  - Rh pos, no rhogam indicated at this time.  - s/p BMZ 1/28-1/29  - Pt anemic at baseline. No additional bleeding since transfer here however with high volume bleed and baseline anemia will continue  inpatient monitoring for another 24 hours. If no bleeding for next 24 hours, plan to repeat labs 1/31 AM and then anticipate discharge after if stable.     Anemia  - Anemic on admission as above, in addition to known anemia: prior Hb 9.5 on 1/16 with ferritin 23  - HB ID: HbA 57%, Hb C 38% c/w HbC trait  - Daily IV Iron sucrose for 3 days while admitted  - Continue PO iron on discharge      Fetal status  - Daily NST  - Nonreactive NST today -> BPP 8/8  - Formal US 1/29: EFW3%, AC3%, FL <1%, HL <1%, 8/8 BPP. Normal dopplers  - Discussed findings of fetal growth restriction on US with patient today, will plan for frequent monitoring with weekly dopplers to assess fetal wellbeing and q3wk growth US.   - Will aim for genetics consult inpatient, ordered     Routine  - GBS collected on admission, no normal tyler or GBS isolated, plan to repeat when anticipating delivery  - TDAP given 1/29  - Flu vaccine 11/23  - 1hr 104 on 1/16  - PPBC: will address     Dispo: Continue inpatient monitoring for another 24 hours. If no bleeding for next 24 hours, plan to repeat labs 1/31 AM and then anticipate discharge after if stable.    Pt seen and discussed with M Attending, Dr. Sweeney.   Suki Barclay MD PGY2  [facility]  Pager [phone]     Principal Problem:    Placental abruption in third trimester  Active Problems:    Hemoglobin C trait (CMS/HCC)    Anemia complicating pregnancy, third trimester    Poor fetal growth affecting management of mother in third trimester    Pyelectasis of fetus on prenatal ultrasound

## 2024-01-31 ENCOUNTER — HOSPITAL ENCOUNTER (INPATIENT)
Facility: HOSPITAL | Age: 34
End: 2024-01-31
Attending: STUDENT IN AN ORGANIZED HEALTH CARE EDUCATION/TRAINING PROGRAM | Admitting: STUDENT IN AN ORGANIZED HEALTH CARE EDUCATION/TRAINING PROGRAM
Payer: COMMERCIAL

## 2024-01-31 VITALS
SYSTOLIC BLOOD PRESSURE: 122 MMHG | WEIGHT: 129.78 LBS | HEIGHT: 60 IN | TEMPERATURE: 98.2 F | HEART RATE: 77 BPM | RESPIRATION RATE: 18 BRPM | OXYGEN SATURATION: 99 % | BODY MASS INDEX: 25.48 KG/M2 | DIASTOLIC BLOOD PRESSURE: 73 MMHG

## 2024-01-31 DIAGNOSIS — O36.5931 POOR FETAL GROWTH AFFECTING MANAGEMENT OF MOTHER IN THIRD TRIMESTER, FETUS 1 OF MULTIPLE GESTATION (HHS-HCC): Primary | ICD-10-CM

## 2024-01-31 LAB
ABO GROUP (TYPE) IN BLOOD: NORMAL
ANTIBODY SCREEN: NORMAL
APTT PPP: 31 SECONDS (ref 27–38)
BLOOD EXPIRATION DATE: NORMAL
BLOOD EXPIRATION DATE: NORMAL
DISPENSE STATUS: NORMAL
DISPENSE STATUS: NORMAL
ERYTHROCYTE [DISTWIDTH] IN BLOOD BY AUTOMATED COUNT: 13.2 % (ref 11.5–14.5)
ERYTHROCYTE [DISTWIDTH] IN BLOOD BY AUTOMATED COUNT: 13.3 % (ref 11.5–14.5)
FIBRINOGEN PPP-MCNC: 324 MG/DL (ref 200–400)
HCT VFR BLD AUTO: 19.7 % (ref 36–46)
HCT VFR BLD AUTO: 22.7 % (ref 36–46)
HGB BLD-MCNC: 6.9 G/DL (ref 12–16)
HGB BLD-MCNC: 7.9 G/DL (ref 12–16)
INR PPP: 0.9 (ref 0.9–1.1)
MCH RBC QN AUTO: 27.2 PG (ref 26–34)
MCH RBC QN AUTO: 28 PG (ref 26–34)
MCHC RBC AUTO-ENTMCNC: 34.8 G/DL (ref 32–36)
MCHC RBC AUTO-ENTMCNC: 35 G/DL (ref 32–36)
MCV RBC AUTO: 78 FL (ref 80–100)
MCV RBC AUTO: 80 FL (ref 80–100)
NRBC BLD-RTO: 3.3 /100 WBCS (ref 0–0)
NRBC BLD-RTO: 4.3 /100 WBCS (ref 0–0)
PLATELET # BLD AUTO: 197 X10*3/UL (ref 150–450)
PLATELET # BLD AUTO: 225 X10*3/UL (ref 150–450)
PRODUCT BLOOD TYPE: 7300
PRODUCT BLOOD TYPE: 7300
PRODUCT CODE: NORMAL
PRODUCT CODE: NORMAL
PROTHROMBIN TIME: 10.3 SECONDS (ref 9.8–12.8)
RBC # BLD AUTO: 2.46 X10*6/UL (ref 4–5.2)
RBC # BLD AUTO: 2.9 X10*6/UL (ref 4–5.2)
RH FACTOR (ANTIGEN D): NORMAL
UNIT ABO: NORMAL
UNIT ABO: NORMAL
UNIT NUMBER: NORMAL
UNIT NUMBER: NORMAL
UNIT RH: NORMAL
UNIT RH: NORMAL
UNIT VOLUME: 350
UNIT VOLUME: 350
VIT B12 SERPL-MCNC: 228 PG/ML (ref 211–911)
WBC # BLD AUTO: 17.8 X10*3/UL (ref 4.4–11.3)
WBC # BLD AUTO: 19 X10*3/UL (ref 4.4–11.3)
XM INTEP: NORMAL
XM INTEP: NORMAL

## 2024-01-31 PROCEDURE — 59025 FETAL NON-STRESS TEST: CPT | Performed by: STUDENT IN AN ORGANIZED HEALTH CARE EDUCATION/TRAINING PROGRAM

## 2024-01-31 PROCEDURE — 86901 BLOOD TYPING SEROLOGIC RH(D): CPT

## 2024-01-31 PROCEDURE — 99238 HOSP IP/OBS DSCHRG MGMT 30/<: CPT | Performed by: STUDENT IN AN ORGANIZED HEALTH CARE EDUCATION/TRAINING PROGRAM

## 2024-01-31 PROCEDURE — 59025 FETAL NON-STRESS TEST: CPT | Mod: GC | Performed by: STUDENT IN AN ORGANIZED HEALTH CARE EDUCATION/TRAINING PROGRAM

## 2024-01-31 PROCEDURE — 85027 COMPLETE CBC AUTOMATED: CPT

## 2024-01-31 PROCEDURE — 82607 VITAMIN B-12: CPT

## 2024-01-31 PROCEDURE — 2500000001 HC RX 250 WO HCPCS SELF ADMINISTERED DRUGS (ALT 637 FOR MEDICARE OP): Performed by: NURSE PRACTITIONER

## 2024-01-31 PROCEDURE — 85610 PROTHROMBIN TIME: CPT

## 2024-01-31 PROCEDURE — 2500000001 HC RX 250 WO HCPCS SELF ADMINISTERED DRUGS (ALT 637 FOR MEDICARE OP): Performed by: STUDENT IN AN ORGANIZED HEALTH CARE EDUCATION/TRAINING PROGRAM

## 2024-01-31 PROCEDURE — 36415 COLL VENOUS BLD VENIPUNCTURE: CPT

## 2024-01-31 PROCEDURE — 85384 FIBRINOGEN ACTIVITY: CPT

## 2024-01-31 RX ORDER — NAPROXEN SODIUM 220 MG/1
162 TABLET, FILM COATED ORAL DAILY
Status: DISCONTINUED | OUTPATIENT
Start: 2024-01-31 | End: 2024-01-31 | Stop reason: HOSPADM

## 2024-01-31 RX ADMIN — ASPIRIN 81 MG CHEWABLE TABLET 162 MG: 81 TABLET CHEWABLE at 10:37

## 2024-01-31 RX ADMIN — METOCLOPRAMIDE 10 MG: 10 TABLET ORAL at 08:07

## 2024-01-31 RX ADMIN — PRENATAL VIT W/ FE FUMARATE-FA TAB 27-0.8 MG 1 TABLET: 27-0.8 TAB at 09:01

## 2024-01-31 ASSESSMENT — PAIN - FUNCTIONAL ASSESSMENT: PAIN_FUNCTIONAL_ASSESSMENT: 0-10

## 2024-01-31 ASSESSMENT — PAIN SCALES - GENERAL: PAINLEVEL_OUTOF10: 0 - NO PAIN

## 2024-01-31 NOTE — DISCHARGE SUMMARY
Discharge Summary    Admission Date: 1/28/2024  Discharge Date: 1/31/2024    Discharge Diagnosis  Placental abruption in third trimester    Hospital Course  Patient admitted on 1/28 for acute onset vaginal bleeding and cramping concerning for placental abruption. Admission labs notable for Anemia with Hgb downtrend from 8.2 -> 7.6, of note patient with known HbC trait an normal coags and fibrinogen. Patient received BMZ x2 1/28-1/29, GBS collected and Tdap given while admitted. SVE 0.5/50/-3 and stable on multiple rechecks Monitored on labor and delivery until VB resolved and transferred to [facility] for observation. Formal US notable for FGR with an EFW 1256g 5%, AC 3% with fetal pyelectasis and PRUV. Received 3 days of IV iron sucrose while admitted for anemia. By HD#4 patient had one mild range BP and mild HA that mostly resolved without changes in vision or RUQ. Strict return precautions provided to the patient if HA Is ongoing. Discharged home with plan for outpatient follow up with OB/GYN provider Dr. Sigala on 02/05/2024    Discharge Meds     Your medication list        CONTINUE taking these medications        Instructions Last Dose Given Next Dose Due   aspirin 81 mg chewable tablet      CHEW 2 TABLETS (162 MG) ONCE DAILY.       ergocalciferol 1.25 MG (95928 UT) capsule  Commonly known as: Vitamin D-2           ferrous sulfate 325 (65 Fe) MG EC tablet      Take 1 tablet by mouth 2 times a day with meals. Do not crush, chew, or split.       PRENATAL 19 ORAL                     Complications Requiring Follow-Up  Placental Abruption    Test Results Pending At Discharge  Pending Labs       Order Current Status    [place] Collected (01/28/24 0615)    Extra Tubes In process    Type And Screen In process            Outpatient Follow-Up  Future Appointments   Date Time Provider Department Center   2/5/2024  4:00 PM Chelsea Sigala MD [id number] [facility]   2/20/2024  4:00 PM MD JAYESH [id number] [facility]   3/5/2024   4:00 PM MD NAILA [id number] [facility]   3/11/2024  4:00 PM MD JAYESH [id number] [facility]   4/2/2024  4:00 PM MD NAILA [id number] [facility]           Bandar Pham MD

## 2024-01-31 NOTE — CARE PLAN
VSS t/o shift. No ctx, cramping, or LOF. Pt reports bleeding has remained the same, only dark brown blood with wipe. FHR WDL during spot checks. Pt has unrelenting HA, MFM aware and trying to treat.      Problem: Antepartum  Goal: Maintain pregnancy as long as maternal and/or fetal condition is stable  Outcome: Progressing  Goal: FHR remains reassuring  Outcome: Progressing     Problem: Pain - Adult  Goal: Verbalizes/displays adequate comfort level or baseline comfort level  Outcome: Progressing     Problem: Safety - Adult  Goal: Free from fall injury  Outcome: Progressing     Problem: Anemia in pregnancy  Goal: Tolerates treatment for anemia  Outcome: Progressing     Problem: UH VAGINAL BLEEDING/HEMORRHAGE AP  Goal: Fewer then 4-6 ct per hour  Outcome: Progressing  Goal: No s/sx of hemorrhage  Outcome: Progressing

## 2024-01-31 NOTE — CARE PLAN
The patient's goals for the shift include maintain pregnancy    The clinical goals for the shift include no vaginal bleeding    Problem: Antepartum  Goal: Maintain pregnancy as long as maternal and/or fetal condition is stable  Outcome: Adequate for Discharge  Goal: FHR remains reassuring  Outcome: Adequate for Discharge     Problem: Pain - Adult  Goal: Verbalizes/displays adequate comfort level or baseline comfort level  Outcome: Adequate for Discharge     Problem: Safety - Adult  Goal: Free from fall injury  Outcome: Adequate for Discharge     Problem: Anemia in pregnancy  Goal: Tolerates treatment for anemia  Outcome: Adequate for Discharge     Problem: UH VAGINAL BLEEDING/HEMORRHAGE AP  Goal: Fewer then 4-6 ct per hour  Outcome: Adequate for Discharge  Goal: No s/sx of hemorrhage  Outcome: Adequate for Discharge

## 2024-02-05 ENCOUNTER — APPOINTMENT (OUTPATIENT)
Dept: OBSTETRICS AND GYNECOLOGY | Facility: CLINIC | Age: 34
End: 2024-02-05
Payer: COMMERCIAL

## 2024-02-05 ENCOUNTER — ROUTINE PRENATAL (OUTPATIENT)
Dept: OBSTETRICS AND GYNECOLOGY | Facility: CLINIC | Age: 34
End: 2024-02-05
Payer: COMMERCIAL

## 2024-02-05 VITALS — SYSTOLIC BLOOD PRESSURE: 118 MMHG | WEIGHT: 146 LBS | DIASTOLIC BLOOD PRESSURE: 68 MMHG | BODY MASS INDEX: 28.51 KG/M2

## 2024-02-05 DIAGNOSIS — O99.013 ANEMIA COMPLICATING PREGNANCY, THIRD TRIMESTER (HHS-HCC): ICD-10-CM

## 2024-02-05 DIAGNOSIS — O35.EXX0 PYELECTASIS OF FETUS ON PRENATAL ULTRASOUND (HHS-HCC): ICD-10-CM

## 2024-02-05 DIAGNOSIS — O45.93 PLACENTAL ABRUPTION IN THIRD TRIMESTER (HHS-HCC): ICD-10-CM

## 2024-02-05 DIAGNOSIS — O36.5931 POOR FETAL GROWTH AFFECTING MANAGEMENT OF MOTHER IN THIRD TRIMESTER, FETUS 1 OF MULTIPLE GESTATION (HHS-HCC): ICD-10-CM

## 2024-02-05 PROCEDURE — 0501F PRENATAL FLOW SHEET: CPT | Performed by: OBSTETRICS & GYNECOLOGY

## 2024-02-05 NOTE — PROGRESS NOTES
Patient reports good fetal movement.  She is without symptoms of PTL or PEC.  She was admitted with an abruption/heavy vaginal bleeding with mild cramping.    She denies any further vaginal bleeding or cramping.  She has an appointment with genetics tomorrow to discuss her recent ultrasound showing pyelectasis with growth restriction.    O: January 29th US - EFW=3%ile, AC=3 %ile, left pylectasis  Admitted for abruption January 30th - 8/10 BPP    HROB -FGR, Abruption, anemia, HbC trait    -  Weekly dopplers     -  Fetal movement     -  S/P Tdap during recent admit     -  S/P IV iron, repeat CBC at next visit    -  Genetics F/U for pylectasis, FGR and short long bones

## 2024-02-06 ENCOUNTER — LAB (OUTPATIENT)
Dept: LAB | Facility: LAB | Age: 34
End: 2024-02-06
Payer: COMMERCIAL

## 2024-02-06 ENCOUNTER — HOSPITAL ENCOUNTER (OUTPATIENT)
Dept: RADIOLOGY | Facility: HOSPITAL | Age: 34
Discharge: HOME | End: 2024-02-06
Payer: COMMERCIAL

## 2024-02-06 ENCOUNTER — TELEPHONE (OUTPATIENT)
Dept: OBSTETRICS AND GYNECOLOGY | Facility: CLINIC | Age: 34
End: 2024-02-06

## 2024-02-06 ENCOUNTER — CLINICAL SUPPORT (OUTPATIENT)
Dept: GENETICS | Facility: HOSPITAL | Age: 34
End: 2024-02-06
Payer: COMMERCIAL

## 2024-02-06 VITALS
HEIGHT: 65 IN | TEMPERATURE: 98 F | BODY MASS INDEX: 24.16 KG/M2 | HEART RATE: 87 BPM | SYSTOLIC BLOOD PRESSURE: 127 MMHG | DIASTOLIC BLOOD PRESSURE: 82 MMHG | RESPIRATION RATE: 15 BRPM | WEIGHT: 145 LBS

## 2024-02-06 DIAGNOSIS — O36.5930 POOR FETAL GROWTH AFFECTING MANAGEMENT OF MOTHER IN THIRD TRIMESTER, SINGLE OR UNSPECIFIED FETUS (HHS-HCC): Primary | ICD-10-CM

## 2024-02-06 DIAGNOSIS — O36.5931 POOR FETAL GROWTH AFFECTING MANAGEMENT OF MOTHER IN THIRD TRIMESTER, FETUS 1 OF MULTIPLE GESTATION (HHS-HCC): ICD-10-CM

## 2024-02-06 PROCEDURE — 76820 UMBILICAL ARTERY ECHO: CPT | Performed by: OBSTETRICS & GYNECOLOGY

## 2024-02-06 PROCEDURE — 76819 FETAL BIOPHYS PROFIL W/O NST: CPT

## 2024-02-06 PROCEDURE — 76819 FETAL BIOPHYS PROFIL W/O NST: CPT | Performed by: OBSTETRICS & GYNECOLOGY

## 2024-02-06 PROCEDURE — 76820 UMBILICAL ARTERY ECHO: CPT

## 2024-02-06 PROCEDURE — 96040 PR MEDICAL GENETICS COUNSELING EACH 30 MINUTES: CPT | Performed by: GENETIC COUNSELOR, MS

## 2024-02-06 PROCEDURE — 96040 HC GENETIC COUNSELING, EACH 30 MIN: CPT | Performed by: GENETIC COUNSELOR, MS

## 2024-02-06 ASSESSMENT — PAIN SCALES - GENERAL: PAINLEVEL: 0-NO PAIN

## 2024-02-15 ENCOUNTER — HOSPITAL ENCOUNTER (OUTPATIENT)
Dept: RADIOLOGY | Facility: HOSPITAL | Age: 34
Discharge: HOME | End: 2024-02-15
Payer: COMMERCIAL

## 2024-02-15 ENCOUNTER — TELEPHONE (OUTPATIENT)
Dept: GENETICS | Facility: CLINIC | Age: 34
End: 2024-02-15
Payer: COMMERCIAL

## 2024-02-15 DIAGNOSIS — O36.5931 POOR FETAL GROWTH AFFECTING MANAGEMENT OF MOTHER IN THIRD TRIMESTER, FETUS 1 OF MULTIPLE GESTATION (HHS-HCC): ICD-10-CM

## 2024-02-15 PROCEDURE — 76820 UMBILICAL ARTERY ECHO: CPT | Performed by: OBSTETRICS & GYNECOLOGY

## 2024-02-15 PROCEDURE — 76819 FETAL BIOPHYS PROFIL W/O NST: CPT

## 2024-02-15 PROCEDURE — 76819 FETAL BIOPHYS PROFIL W/O NST: CPT | Performed by: OBSTETRICS & GYNECOLOGY

## 2024-02-15 PROCEDURE — 76820 UMBILICAL ARTERY ECHO: CPT

## 2024-02-15 PROCEDURE — 76821 MIDDLE CEREBRAL ARTERY ECHO: CPT | Performed by: OBSTETRICS & GYNECOLOGY

## 2024-02-15 NOTE — TELEPHONE ENCOUNTER
2/15/24:  I left a voicemail for this patient to call me back to discuss results.      2/22/24: Patient is being informed of risk reducing cell-free DNA screening [employer] results by Dr. Kylie Herrera.

## 2024-02-20 ENCOUNTER — ROUTINE PRENATAL (OUTPATIENT)
Dept: OBSTETRICS AND GYNECOLOGY | Facility: CLINIC | Age: 34
End: 2024-02-20
Payer: COMMERCIAL

## 2024-02-20 VITALS — WEIGHT: 146 LBS | BODY MASS INDEX: 24.3 KG/M2 | SYSTOLIC BLOOD PRESSURE: 120 MMHG | DIASTOLIC BLOOD PRESSURE: 80 MMHG

## 2024-02-20 DIAGNOSIS — O35.EXX0 PYELECTASIS OF FETUS ON PRENATAL ULTRASOUND (HHS-HCC): ICD-10-CM

## 2024-02-20 DIAGNOSIS — O45.93 PLACENTAL ABRUPTION IN THIRD TRIMESTER (HHS-HCC): ICD-10-CM

## 2024-02-20 DIAGNOSIS — O99.013 ANEMIA COMPLICATING PREGNANCY, THIRD TRIMESTER (HHS-HCC): ICD-10-CM

## 2024-02-20 LAB
HEMOGLOBIN A2: 3.5 % (ref 2–3.5)
HEMOGLOBIN A: 57.9 % (ref 95.8–98)
HEMOGLOBIN C: 38.4 %
HEMOGLOBIN F: 0.2 % (ref 0–2)
HEMOGLOBIN IDENTIFICATION INTERPRETATION: ABNORMAL
HEMOGLOBIN OTHER: 0 %
HEMOGLOBIN S: 0 %
PATH REVIEW-HGB IDENTIFICATION: ABNORMAL

## 2024-02-20 PROCEDURE — 0501F PRENATAL FLOW SHEET: CPT | Performed by: OBSTETRICS & GYNECOLOGY

## 2024-02-20 NOTE — PROGRESS NOTES
Patient reports good fetal movement.  She is without symptoms of PTL or PEC.  She is starting get ready for delivery.  She is awaiting her genetic testing results.    O: February 15th US - normal dopplers and AFV with 8/8 BPP     A/P: HROB - FGR     -  F/U US for growth, BPP and doppers this week     -  Fetal movement     -  Repeat CBC     -  GBS cx at 35 weeks, d/w the patient

## 2024-02-20 NOTE — PROGRESS NOTES
"Thang Holland is a 33 y.o. old, , female who was approximately 31 weeks and 6 days pregnant at the time of our appointment with an EDC of 4/3/24.  She was seen to discuss her genetic screening and testing options due to an abnormal fetal ultrasound.      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  The patient has low MCV values on CBC.  The most recent MCV value is 76 fL.  The patient reported that she has iron deficiency anemia.  The patient was recently admitted on  for vaginal bleeding and cramping concerning for placental abruption.  Per the note, the admission labs were notable for Anemia with Hgb downtrend from 8.2 -> 7.6.  The patient received 3 days of IV iron sucrose while admitted.    Ultrasound 24, per report:  \"- Decreased interval fetal growth with EFW at 3%ile and AC at 3%ile  - Left pyelectasis measuring 7.6mm is noted. There is no caliectasis or ureteral dilation. There is also a persistent right umbilical vein noted. Long bones are short however only FL is >2SD from the mean, likely representing a normal variant. Femur:foot ratio is 0.97 which is normal  - NST reactive, BPP 10/10, normal amniotic fluid  - Normal UA Doppler indices...\"    Ultrasound 23, per report:  \"- No soft markers for fetal trisomy noted (see genetics section)  -Detailed anatomic evaluation of the fetal brain/ventricles, face, heart/outflow tracts and chest anatomy, abdominal organ specific anatomy, number/length/architecture of  limbs and detailed evaluation of the umbilical cord and placenta and other fetal anatomy as clinically indicated was performed.  -No malformations or markers for aneuploidy were identified on this comprehensive survey within limitations of sonographic evaluation at this gestational age.  The patient has not had any screening or diagnostic testing for aneuploidy in her pregnancy thus far...\"    The patient has not had " any screening or diagnostic testing for aneuploidy in her pregnancy thus far.    FAMILY HISTORY:  Medical and family histories were reviewed and the following concerns regarding this pregnancy were apparent:      Ms. Holland:  Paternal half-sister- carrier of an X linked form of Chronic Granulomatous Disease (inherited from her mother, who is of no blood relation to the patient), had two sons with Chronic Granulomatous Disease, , has a daughter who is a carrier of X linked Chronic Granulomatous Disease  Mother- breast cancer diagnosed at 57 years old, negative genetic testing  Maternal aunt- breast cancer diagnosed in her 60's,   Maternal grandmother- breast cancer diagnosed in her 80's,   Father- gout, type II diabetes  Paternal half brother- potentially has intellectual disability, the patient is unsure if this individual is of blood relation to her father    Her partner, David:  Personal history- high blood pressure  Mother- high blood pressure    The remainder of the family history was negative for birth defects, intellectual disability, recurrent pregnancy loss, or recognized inherited conditions.  Consanguinity was denied.  The Pedigree is scanned in the Media tab and is available for a full review of the family history.      ETHNICITY:  The patient reported that she is of [place]  ethnicity.  The patient reported that her partner is of  ethnicity.  Ashkenazi Jew Ancestry was denied.    We discussed the availability, benefits, and limitations of carrier screening for cystic fibrosis (CF), spinal muscular atrophy (SMA).  Please see hemoglobinopathy/thalassemia carrier screening discussion in the counseling section of this report.  If both members of the couple are found to be carriers for the same autosomal recessive condition, there is a 25% chance for an affected child and prenatal diagnosis would be available. Negative carrier screening does not rule out the  possibility of being a carrier. [place] screening is available for CF, hemoglobinopathies, and thalassemias, and SMA.  We also discussed the availability of more expanded/pan ethnic carrier screening for additional, primarily autosomal recessive, conditions. We discussed the pros and cons of expanded carrier screening including the higher likelihood of being identified as a carrier for at least one condition on a larger panel. Approximately 2-4% of couples are found to be at risk to have a child with a genetic disorder based on this screening. In rare cases, expanded carrier screening results may have health implications for the tested individual.      After careful consideration, the patient declined all carrier screening.      COUNSELING:  The following information was discussed with your patient:    1. Pyelectasis was identified on prenatal ultrasound. Pyelectasis is an enlargement of a certain part of the kidney. This is usually related to an increase in the amount of urine present in the kidney. Pyelectasis can be found in one (unilateral) or both (bilateral) kidneys. ([facility] [place], 2014)- Currently, some professional organizations define pyelectasis as dilation greater than or equal to 4mm at <33 weeks gestation and greater than 7mm at >33 weeks gestation.     Pyelectasis is a common finding. Approximately 2-3% of babies have pyelectasis. Isolated pyelectasis (pyelectasis with no other findings) is seen in 0.7% of babies. Pyelectasis can occur for several reasons. In some cases, this may be due to a blockage along the urinary tract, other structural problems, or urinary reflux. This can also be seen in babies as normal variation.      Studies have found that pyelectasis may be more common in babies with Down syndrome. This risk is thought to be low when pyelectasis is isolated (found all alone) at an approximate 2.8 fold increase. However, if there are other finding seen on the ultrasound in addition to the  pyelectasis, the risk for Down syndrome or other genetic conditions may increase.     2. Intrauterine growth restriction (IUGR) is when the baby is smaller the expected (the estimated fetal weight is below the 10th percentile for the gestational age).  There are many different proposed causes of IUGR which can be maternal, infectious, fetal, or placental.  Some babies with IUGR are just small and born healthy with no associated health problems.     Fetal factors that can be associated with IUGR are genetic causes (approximately 5-20% of cases of IUGR). Genetic causes include aneuploidies (ex- Trisomy 18), microdeletion syndromes, inborn errors of metabolism, single gene disorders, and other genetic causes. In cases of structural malformations (single umbilical artery, cardiac malformations, renal abnormalities etc.), 25% of fetuses will also have IUGR. Infections account for less than 5% of cases of IUGR.     Maternal factors associated with IUGR include but are not limited to certain maternal age groups, socioeconomic status, poor maternal nutrition, low maternal birth weight. Exposures to tobacco, maternal drug use, and certain medications also increase the risk for IUGR. Maternal disorders related to placental blood flow including chronic hypertension, preeclampsia, acquired thrombophilia, antiphospholipid syndrome, etc. are associated with IUGR. Placental factors such as placental insufficiency, confined placental mosaicism and other placental problems are associated with IUGR.       3. Persistent right umbilical vein (PRUV) occurs in a fetus when the left umbilical vein regresses and the right vein remains open.  When PRUV is found, there is an increased risk for structural anomalies in the baby. The most common anomalies are cardiovascular (heart), genitourinary, gastrointestinal, respiratory and other anomalies.  In isolation, this is often a normal variant.  This finding has been reported in babies with  chromosomal abnormalities, but often with additional findings.      4. The availability, benefits and limitations of standard cell-free DNA screening.  We discussed the methodology for this screen, which includes using cell-free DNA obtained from a mother's blood (derived from the placenta) to screen for the presence of common chromosomal abnormalities. Depending on the laboratory used, there is a >99% sensitivity for Down syndrome, at least 97.4% sensitivity for trisomy 18, and at least 91% sensitivity for trisomy 13.  Specificity for these trisomies is >99%. Although sensitivity and specificity rates are high, in our experience the positive predictive value is dependent on many factors; whereas false negative results are rare.  In addition, anticoagulants, maternal chromosome abnormality, fibroids or malignancy may impact results and/or be associated with inconclusive or other abnormal findings.  This is not a diagnostic test.  Therefore, in the event of an abnormal result, prenatal diagnosis through amniocentesis is recommended to confirm the findings, if confirmation is desired.  Results take approximately 7-10 days.      5. The availability, benefits, and limitations of the MaterniT GENOME cell free DNA screen.  This test is designed to screen for any chromosome abnormality, including deletions and duplications, that are greater than or equal to 7 Mb in size, with at least 95.9% sensitivity and 99.9% specificity. It also includes screening for select microdeletions including 22q11 deletion (associated with DiGeorge syndrome), 15q11 deletion (associated with Prader-Willi/Angelman syndromes), 11q23 deletion (associated with Aileen syndrome), 8q24 deletion (associated with Leticia-Giedion syndrome), 5p15 deletion (associated with Cri-du-Chat syndrome), 4p16 deletion (associated with De La Garza-Hirschhorn syndrome), and 1p36 deletion (associated with 1p36 deletion syndrome). This is the most comprehensive fetal  chromosome test currently clinically available noninvasively. As with standard cell-free DNA analysis, false positives and false negatives are possible.   In addition, anticoagulants, maternal chromosome abnormality, fibroids or malignancy may impact results and/or be associated with inconclusive or other abnormal findings.  In the event of an abnormal result, prenatal diagnosis through amniocentesis should be considered to confirm the findings, if confirmation is desired.      6. The availability of Vistara, a noninvasive cell-free placental DNA screen for select single gene disorders. The conditions screened and benefits and limitations of this technology were discussed.    7. The methods, benefits, limitations and risks of amniocentesis.  There is an approximate 1 in 400 risk of complications including a 1 in 800 risk of miscarriage.    8.  The benefits and limitations of maternal blood viral studies.      9. 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 Approximately 1 in 50 individuals of  ancestry are carriers of Hemoglobin C trait, and approximately 1 in 10 individuals of  ancestry are carriers of sickle cell trait.     The term sickle cell disease includes a group of disorders.  In the [place], sickle cell anemia (Hb SS) accounts for approximately 60%-70% cases of sickle cell disease. The other forms of sickle cell disease result from the inheritance of Hb S with other abnormal globin chain variants such as sickle-hemoglobin C disease (Hb SC), sickle-beta thalassemia, and other abnormal variants (E, D, etc).    Hemoglobin is a molecule in red blood cells that delivers oxygen to cells in the body. Individuals with this disorder have hemoglobin molecules that distort red blood cells into a sickled (C-shape/crescent) shape. These sickled cells break down prematurely, which leads to anemia. Long lasting anemia can lead to organ damage and other complications. Signs  and symptoms of sickle cell disease usually begin in early childhood and include recurrent infections, anemia, and periods of pain. The severity of symptoms varies. Some individuals have mild symptoms, while others are frequently hospitalized for more serious complications.   We also briefly reviewed that clinical features of those homozygous for Hemoglobin C trait or with Hemoglobin C/? Thalassemia.      We reviewed the inheritance of sickle cell disease. This condition is inherited in an autosomal recessive manner, which means that individuals who have sickle cell disease have a mutation (or change) in BOTH of their copies of the HBB gene. An individual who has a mutation in only ONE copy of their gene is called a carrier. When both parents are carriers of a hemoglobinopathy, in each pregnancy, there is a 25% (1 in 4) chance of having a child affected with a hemoglobinopathy, a 50% (2 in 4) chance of having a child who is a carrier, and a 25% (1 in 4) chance of having a child who is not affected and not a carrier.    Based on ethnicity alone, the couple has a 1 in 40 (2.5%) chance for each pregnancy together to be affected with Hemoglobin SC disease  If her partner was found to be a carrier there would be a 1 in 4 (25%) chance for each pregnancy to be affected with a hemoglobinopathy.     If both members of a couple are found to be carriers of hemoglobinopathies/thalassemias prenatal diagnosis in pregnancy is available and prenatal testing of embryos may also be available prior to a future pregnancy (PGT-M). We discussed the option of [facility] and genetic amniocentesis or testing the child at birth, and the [place]  screen. We discussed that even if the father of the pregnancy did not get hemoglobinopathy screening, diagnostic testing could still be performed, but it is not as informative. There is also cell-free DNA screening clinically available via [place] for certain single gene disorders such as  hemoglobinopathies/thalassemias and does not require a paternal sample. Data on clinical performance metrics is limited. The patient declined [place] cell-free DNA screening.  The patient declined a genetic amniocentesis for hemoglobinopathies.  The patient stated that she will discuss with her partner if he is interested in hemoglobinopathy screening.  If he is interested the patient will contact our office back.      The patient's offspring have at least a 50% chance per pregnancy to have Hemoglobin C trait. This risk may be higher for unaffected children if her partner is determined to also be a carrier of a hemoglobinopathy or beta thalassemia. We recommend that the couple share this information with all of their children when of reproductive age but prior to reproducing and family members who may be considering having children in the future, as they may wish to pursue carrier testing themselves based on these results.     10. The patient has low MCV values on CBC. We discussed that this could be due to Hemoglobin C trait, alpha thalassemia trait, iron deficiency, other or unknown factors. The clinical features and inheritance of alpha thalassemia were reviewed. We discussed the option of screening of the HBB and HBA1/2 genes for the patient via [employer] carrier screening.  The patient declined this testing.      11. Additional Family History Information:  The patient has a family history of diabetes and gout. The patient's partner has a personal history of high blood pressure.  The patient's partner has a family history of high blood pressure. Often, these conditions are due to a combination of genetic and environmental factors (which often remain unknown).  The risk to have them often depends on the amount and degree of affected family members.  It also depends on if an underlying genetic cause of these conditions can be identified.   With this history, the couple and their offspring are at an increased  risk for the disorders in their respective families and this information should be shared with all relevant primary care providers.      We discussed the patient's family history of reported X linked Chronic Granulomatous Disease.  Because this is inherited through the patient's paternal half-siblings mother (who is of no blood relation to the patient), this does not increase the risk for the patient to be a carrier of this disorder, above that of the general population.  We recommend that the patient obtain a copy of the genetic test report and send this to our office so that we can confirm the diagnosis and inheritance pattern.      The patient potentially has a family history of intellectual disability.  The patient did not know if any genetic testing has been performed for the individual with intellectual disability.  Intellectual disability may occur as part of a chromosome abnormality or single gene disorder.  When isolated, it may result from a combination of genes and environment, also referred to as multifactorial causes. Without further information it is difficult to predict risk of intellectual disability for the current pregnancy, but may be increased above that of the general population. A general genetics evaluation is recommended for any individual with intellectual disability and an appointment can be made by calling [phone].  If an underlying genetic etiology in the family is determined, precise recurrence risks could be provided, and testing for other family members may be available if clinically indicated.      DISPOSITION:  The patient stated that she understood the above information and elected to proceed with MaterniTGenome cell-free DNA screening.  All carrier screening was declined.  Vistara cell-free DNA screening was declined.  Blood viral studies were declined.  [place] cell-free DNA screening was declined.  Diagnostic testing in the form of genetic amniocentesis was declined.  The  patient stated that she would not terminate a pregnancy for any reason, including any chromosomal abnormality or genetic condition in the fetus.    We recommend that the patient discuss with her partner if he is interested in hemoglobinopathy/thalassemia carrier screening.  If he is interested in this screening the couple may make a genetic counseling appointment by calling [phone] so that he can discuss/consent to carrier screening.      Close monitoring of the pregnancy is indicated due to the fetal ultrasound findings.  We recommended following the surveillance guidelines as noted in the ultrasound reports.  A pediatric nephrology consultation may be considered (due to the risk for UPJ obstruction, vesicoureteral reflux (VUR), and other potential underlying causes). We recommend a post- evaluation of the kidneys if the dilation has not resolved.      Thank you for allowing us to participate in the care of your patient.  Should you or your patient have any questions, please do not hesitate to contact our office at [phone].    Licensed Genetic Counselor Bozena Gonzalez MS, [facility] spent 56 minutes with the patient with greater than 50% of the time spent in face to face counseling.     Sincerely,    Bozena Gonzalez MS  Licensed Genetic Counselor    Reviewed by:  Dr. Shan Schwarz MD  Maternal Fetal Medicine Specialist

## 2024-02-22 ENCOUNTER — HOSPITAL ENCOUNTER (OUTPATIENT)
Dept: RADIOLOGY | Facility: HOSPITAL | Age: 34
Discharge: HOME | End: 2024-02-22
Payer: COMMERCIAL

## 2024-02-22 DIAGNOSIS — O36.5931 POOR FETAL GROWTH AFFECTING MANAGEMENT OF MOTHER IN THIRD TRIMESTER, FETUS 1 OF MULTIPLE GESTATION (HHS-HCC): ICD-10-CM

## 2024-02-22 PROCEDURE — 76816 OB US FOLLOW-UP PER FETUS: CPT

## 2024-02-22 PROCEDURE — 76820 UMBILICAL ARTERY ECHO: CPT | Performed by: OBSTETRICS & GYNECOLOGY

## 2024-02-22 PROCEDURE — 76819 FETAL BIOPHYS PROFIL W/O NST: CPT | Performed by: OBSTETRICS & GYNECOLOGY

## 2024-02-22 PROCEDURE — 76820 UMBILICAL ARTERY ECHO: CPT

## 2024-02-22 PROCEDURE — 76819 FETAL BIOPHYS PROFIL W/O NST: CPT

## 2024-02-22 PROCEDURE — 76816 OB US FOLLOW-UP PER FETUS: CPT | Performed by: OBSTETRICS & GYNECOLOGY

## 2024-02-26 ENCOUNTER — TELEPHONE (OUTPATIENT)
Dept: OBSTETRICS AND GYNECOLOGY | Facility: CLINIC | Age: 34
End: 2024-02-26
Payer: COMMERCIAL

## 2024-02-26 ENCOUNTER — HOSPITAL ENCOUNTER (OUTPATIENT)
Dept: RADIOLOGY | Facility: HOSPITAL | Age: 34
Discharge: HOME | End: 2024-02-26
Payer: COMMERCIAL

## 2024-02-26 DIAGNOSIS — O36.5931 POOR FETAL GROWTH AFFECTING MANAGEMENT OF MOTHER IN THIRD TRIMESTER, FETUS 1 OF MULTIPLE GESTATION (HHS-HCC): ICD-10-CM

## 2024-02-26 DIAGNOSIS — O36.5931: ICD-10-CM

## 2024-02-26 PROCEDURE — 76819 FETAL BIOPHYS PROFIL W/O NST: CPT | Performed by: OBSTETRICS & GYNECOLOGY

## 2024-02-26 PROCEDURE — 76820 UMBILICAL ARTERY ECHO: CPT | Performed by: OBSTETRICS & GYNECOLOGY

## 2024-02-26 PROCEDURE — 76820 UMBILICAL ARTERY ECHO: CPT

## 2024-02-26 PROCEDURE — 76819 FETAL BIOPHYS PROFIL W/O NST: CPT

## 2024-02-26 NOTE — TELEPHONE ENCOUNTER
Name/ verified  Called pt to discuss recent results of last BPP. Dr. Sigala reviewed and is ordering an MFM consult. Pt educated on MFM and reason for consult. Reviewed +FM per pt. Reviewed daily fetal movement count with patient and awareness of baby's regular movements. Advised to L&D if she does not feel 10 movements in two hours. Pt verbalized understanding.

## 2024-02-29 ENCOUNTER — HOSPITAL ENCOUNTER (OUTPATIENT)
Dept: RADIOLOGY | Facility: HOSPITAL | Age: 34
Discharge: HOME | End: 2024-02-29
Payer: COMMERCIAL

## 2024-02-29 DIAGNOSIS — O36.5930 POOR FETAL GROWTH AFFECTING MANAGEMENT OF MOTHER IN THIRD TRIMESTER, SINGLE OR UNSPECIFIED FETUS (HHS-HCC): ICD-10-CM

## 2024-02-29 PROCEDURE — 76819 FETAL BIOPHYS PROFIL W/O NST: CPT | Performed by: OBSTETRICS & GYNECOLOGY

## 2024-02-29 PROCEDURE — 76820 UMBILICAL ARTERY ECHO: CPT | Performed by: OBSTETRICS & GYNECOLOGY

## 2024-02-29 PROCEDURE — 76819 FETAL BIOPHYS PROFIL W/O NST: CPT

## 2024-02-29 PROCEDURE — 76820 UMBILICAL ARTERY ECHO: CPT

## 2024-03-05 ENCOUNTER — HOSPITAL ENCOUNTER (INPATIENT)
Facility: HOSPITAL | Age: 34
LOS: 4 days | Discharge: HOME | End: 2024-03-09
Attending: STUDENT IN AN ORGANIZED HEALTH CARE EDUCATION/TRAINING PROGRAM | Admitting: STUDENT IN AN ORGANIZED HEALTH CARE EDUCATION/TRAINING PROGRAM
Payer: COMMERCIAL

## 2024-03-05 ENCOUNTER — TELEPHONE (OUTPATIENT)
Dept: OBSTETRICS AND GYNECOLOGY | Facility: CLINIC | Age: 34
End: 2024-03-05

## 2024-03-05 ENCOUNTER — ANESTHESIA (OUTPATIENT)
Dept: OBSTETRICS AND GYNECOLOGY | Facility: HOSPITAL | Age: 34
End: 2024-03-05
Payer: COMMERCIAL

## 2024-03-05 ENCOUNTER — APPOINTMENT (OUTPATIENT)
Dept: OBSTETRICS AND GYNECOLOGY | Facility: CLINIC | Age: 34
End: 2024-03-05
Payer: COMMERCIAL

## 2024-03-05 ENCOUNTER — ANESTHESIA EVENT (OUTPATIENT)
Dept: OBSTETRICS AND GYNECOLOGY | Facility: HOSPITAL | Age: 34
End: 2024-03-05
Payer: COMMERCIAL

## 2024-03-05 DIAGNOSIS — O14.10 SEVERE PRE-ECLAMPSIA, ANTEPARTUM (HHS-HCC): Primary | ICD-10-CM

## 2024-03-05 DIAGNOSIS — O46.90 VAGINAL BLEEDING IN PREGNANCY (HHS-HCC): ICD-10-CM

## 2024-03-05 LAB
ABO GROUP (TYPE) IN BLOOD: NORMAL
ALBUMIN SERPL BCP-MCNC: 2.8 G/DL (ref 3.4–5)
ALBUMIN SERPL BCP-MCNC: 2.9 G/DL (ref 3.4–5)
ALP SERPL-CCNC: 155 U/L (ref 33–110)
ALP SERPL-CCNC: 171 U/L (ref 33–110)
ALT SERPL W P-5'-P-CCNC: 27 U/L (ref 7–45)
ALT SERPL W P-5'-P-CCNC: 28 U/L (ref 7–45)
ANION GAP SERPL CALC-SCNC: 14 MMOL/L (ref 10–20)
ANION GAP SERPL CALC-SCNC: 15 MMOL/L (ref 10–20)
ANTIBODY SCREEN: NORMAL
APTT PPP: 30 SECONDS (ref 27–38)
APTT PPP: 31 SECONDS (ref 27–38)
AST SERPL W P-5'-P-CCNC: 30 U/L (ref 9–39)
AST SERPL W P-5'-P-CCNC: 34 U/L (ref 9–39)
BILIRUB SERPL-MCNC: 0.2 MG/DL (ref 0–1.2)
BILIRUB SERPL-MCNC: 0.2 MG/DL (ref 0–1.2)
BUN SERPL-MCNC: 10 MG/DL (ref 6–23)
BUN SERPL-MCNC: 11 MG/DL (ref 6–23)
CALCIUM SERPL-MCNC: 8.5 MG/DL (ref 8.6–10.6)
CALCIUM SERPL-MCNC: 9.1 MG/DL (ref 8.6–10.6)
CHLORIDE SERPL-SCNC: 104 MMOL/L (ref 98–107)
CHLORIDE SERPL-SCNC: 107 MMOL/L (ref 98–107)
CO2 SERPL-SCNC: 18 MMOL/L (ref 21–32)
CO2 SERPL-SCNC: 20 MMOL/L (ref 21–32)
CREAT SERPL-MCNC: 0.62 MG/DL (ref 0.5–1.05)
CREAT SERPL-MCNC: 0.65 MG/DL (ref 0.5–1.05)
CREAT UR-MCNC: 134.3 MG/DL (ref 20–320)
EGFRCR SERPLBLD CKD-EPI 2021: >90 ML/MIN/1.73M*2
EGFRCR SERPLBLD CKD-EPI 2021: >90 ML/MIN/1.73M*2
ERYTHROCYTE [DISTWIDTH] IN BLOOD BY AUTOMATED COUNT: 17.5 % (ref 11.5–14.5)
ERYTHROCYTE [DISTWIDTH] IN BLOOD BY AUTOMATED COUNT: 17.5 % (ref 11.5–14.5)
FIBRINOGEN PPP-MCNC: 578 MG/DL (ref 200–400)
FIBRINOGEN PPP-MCNC: 594 MG/DL (ref 200–400)
GLUCOSE SERPL-MCNC: 138 MG/DL (ref 74–99)
GLUCOSE SERPL-MCNC: 75 MG/DL (ref 74–99)
HCT VFR BLD AUTO: 29.8 % (ref 36–46)
HCT VFR BLD AUTO: 31.7 % (ref 36–46)
HGB BLD-MCNC: 10.7 G/DL (ref 12–16)
HGB BLD-MCNC: 11.3 G/DL (ref 12–16)
HOLD SPECIMEN: NORMAL
HOLD SPECIMEN: NORMAL
INR PPP: 0.9 (ref 0.9–1.1)
INR PPP: 0.9 (ref 0.9–1.1)
MCH RBC QN AUTO: 29.1 PG (ref 26–34)
MCH RBC QN AUTO: 29.2 PG (ref 26–34)
MCHC RBC AUTO-ENTMCNC: 35.6 G/DL (ref 32–36)
MCHC RBC AUTO-ENTMCNC: 35.9 G/DL (ref 32–36)
MCV RBC AUTO: 81 FL (ref 80–100)
MCV RBC AUTO: 82 FL (ref 80–100)
NRBC BLD-RTO: 0.2 /100 WBCS (ref 0–0)
NRBC BLD-RTO: 0.2 /100 WBCS (ref 0–0)
PLATELET # BLD AUTO: 198 X10*3/UL (ref 150–450)
PLATELET # BLD AUTO: 205 X10*3/UL (ref 150–450)
POTASSIUM SERPL-SCNC: 4 MMOL/L (ref 3.5–5.3)
POTASSIUM SERPL-SCNC: 4.5 MMOL/L (ref 3.5–5.3)
PROT SERPL-MCNC: 5.7 G/DL (ref 6.4–8.2)
PROT SERPL-MCNC: 5.9 G/DL (ref 6.4–8.2)
PROT UR-ACNC: 702 MG/DL (ref 5–24)
PROT/CREAT UR: 5.23 MG/MG CREAT (ref 0–0.17)
PROTHROMBIN TIME: 9.9 SECONDS (ref 9.8–12.8)
PROTHROMBIN TIME: 9.9 SECONDS (ref 9.8–12.8)
RBC # BLD AUTO: 3.66 X10*6/UL (ref 4–5.2)
RBC # BLD AUTO: 3.88 X10*6/UL (ref 4–5.2)
RH FACTOR (ANTIGEN D): NORMAL
SODIUM SERPL-SCNC: 133 MMOL/L (ref 136–145)
SODIUM SERPL-SCNC: 136 MMOL/L (ref 136–145)
TREPONEMA PALLIDUM IGG+IGM AB [PRESENCE] IN SERUM OR PLASMA BY IMMUNOASSAY: NONREACTIVE
WBC # BLD AUTO: 12.6 X10*3/UL (ref 4.4–11.3)
WBC # BLD AUTO: 9.4 X10*3/UL (ref 4.4–11.3)

## 2024-03-05 PROCEDURE — 2500000002 HC RX 250 W HCPCS SELF ADMINISTERED DRUGS (ALT 637 FOR MEDICARE OP, ALT 636 FOR OP/ED)

## 2024-03-05 PROCEDURE — 7210000002 HC LABOR PER HOUR

## 2024-03-05 PROCEDURE — 2500000004 HC RX 250 GENERAL PHARMACY W/ HCPCS (ALT 636 FOR OP/ED): Performed by: STUDENT IN AN ORGANIZED HEALTH CARE EDUCATION/TRAINING PROGRAM

## 2024-03-05 PROCEDURE — 85027 COMPLETE CBC AUTOMATED: CPT

## 2024-03-05 PROCEDURE — 36415 COLL VENOUS BLD VENIPUNCTURE: CPT

## 2024-03-05 PROCEDURE — 86900 BLOOD TYPING SEROLOGIC ABO: CPT

## 2024-03-05 PROCEDURE — 85730 THROMBOPLASTIN TIME PARTIAL: CPT

## 2024-03-05 PROCEDURE — 86850 RBC ANTIBODY SCREEN: CPT

## 2024-03-05 PROCEDURE — 86901 BLOOD TYPING SEROLOGIC RH(D): CPT

## 2024-03-05 PROCEDURE — 87081 CULTURE SCREEN ONLY: CPT

## 2024-03-05 PROCEDURE — 82570 ASSAY OF URINE CREATININE: CPT

## 2024-03-05 PROCEDURE — 2500000004 HC RX 250 GENERAL PHARMACY W/ HCPCS (ALT 636 FOR OP/ED)

## 2024-03-05 PROCEDURE — 1100000001 HC PRIVATE ROOM DAILY

## 2024-03-05 PROCEDURE — 85384 FIBRINOGEN ACTIVITY: CPT

## 2024-03-05 PROCEDURE — 85610 PROTHROMBIN TIME: CPT

## 2024-03-05 PROCEDURE — 86920 COMPATIBILITY TEST SPIN: CPT

## 2024-03-05 PROCEDURE — 80053 COMPREHEN METABOLIC PANEL: CPT

## 2024-03-05 PROCEDURE — 86780 TREPONEMA PALLIDUM: CPT

## 2024-03-05 PROCEDURE — 59050 FETAL MONITOR W/REPORT: CPT

## 2024-03-05 RX ORDER — ONDANSETRON HYDROCHLORIDE 2 MG/ML
4 INJECTION, SOLUTION INTRAVENOUS EVERY 6 HOURS PRN
Status: DISCONTINUED | OUTPATIENT
Start: 2024-03-05 | End: 2024-03-05 | Stop reason: SDUPTHER

## 2024-03-05 RX ORDER — OXYTOCIN/0.9 % SODIUM CHLORIDE 30/500 ML
2-30 PLASTIC BAG, INJECTION (ML) INTRAVENOUS CONTINUOUS
Status: DISCONTINUED | OUTPATIENT
Start: 2024-03-05 | End: 2024-03-06

## 2024-03-05 RX ORDER — LABETALOL HYDROCHLORIDE 5 MG/ML
20 INJECTION, SOLUTION INTRAVENOUS ONCE AS NEEDED
Status: DISCONTINUED | OUTPATIENT
Start: 2024-03-05 | End: 2024-03-05 | Stop reason: SDUPTHER

## 2024-03-05 RX ORDER — NIFEDIPINE 30 MG/1
30 TABLET, FILM COATED, EXTENDED RELEASE ORAL ONCE
Status: COMPLETED | OUTPATIENT
Start: 2024-03-05 | End: 2024-03-05

## 2024-03-05 RX ORDER — LABETALOL HYDROCHLORIDE 5 MG/ML
20 INJECTION, SOLUTION INTRAVENOUS ONCE
Status: COMPLETED | OUTPATIENT
Start: 2024-03-05 | End: 2024-03-05

## 2024-03-05 RX ORDER — MISOPROSTOL 200 UG/1
800 TABLET ORAL ONCE AS NEEDED
Status: DISCONTINUED | OUTPATIENT
Start: 2024-03-05 | End: 2024-03-06

## 2024-03-05 RX ORDER — TRANEXAMIC ACID 100 MG/ML
1000 INJECTION, SOLUTION INTRAVENOUS ONCE AS NEEDED
Status: DISCONTINUED | OUTPATIENT
Start: 2024-03-05 | End: 2024-03-06

## 2024-03-05 RX ORDER — LIDOCAINE HYDROCHLORIDE 10 MG/ML
30 INJECTION INFILTRATION; PERINEURAL ONCE AS NEEDED
Status: DISCONTINUED | OUTPATIENT
Start: 2024-03-05 | End: 2024-03-06

## 2024-03-05 RX ORDER — LABETALOL HYDROCHLORIDE 5 MG/ML
20 INJECTION, SOLUTION INTRAVENOUS ONCE AS NEEDED
Status: COMPLETED | OUTPATIENT
Start: 2024-03-05 | End: 2024-03-05

## 2024-03-05 RX ORDER — LABETALOL HYDROCHLORIDE 5 MG/ML
80 INJECTION, SOLUTION INTRAVENOUS ONCE
Status: DISCONTINUED | OUTPATIENT
Start: 2024-03-05 | End: 2024-03-06

## 2024-03-05 RX ORDER — OXYTOCIN 10 [USP'U]/ML
10 INJECTION, SOLUTION INTRAMUSCULAR; INTRAVENOUS ONCE AS NEEDED
Status: DISCONTINUED | OUTPATIENT
Start: 2024-03-05 | End: 2024-03-06

## 2024-03-05 RX ORDER — NIFEDIPINE 60 MG/1
60 TABLET, FILM COATED, EXTENDED RELEASE ORAL EVERY 24 HOURS
Status: DISCONTINUED | OUTPATIENT
Start: 2024-03-06 | End: 2024-03-06

## 2024-03-05 RX ORDER — OXYTOCIN/0.9 % SODIUM CHLORIDE 30/500 ML
60 PLASTIC BAG, INJECTION (ML) INTRAVENOUS ONCE AS NEEDED
Status: COMPLETED | OUTPATIENT
Start: 2024-03-05 | End: 2024-03-06

## 2024-03-05 RX ORDER — LIDOCAINE HYDROCHLORIDE 10 MG/ML
0.5 INJECTION INFILTRATION; PERINEURAL ONCE AS NEEDED
Status: DISCONTINUED | OUTPATIENT
Start: 2024-03-05 | End: 2024-03-05

## 2024-03-05 RX ORDER — NIFEDIPINE 10 MG/1
10 CAPSULE ORAL ONCE AS NEEDED
Status: DISCONTINUED | OUTPATIENT
Start: 2024-03-05 | End: 2024-03-05 | Stop reason: SDUPTHER

## 2024-03-05 RX ORDER — MAGNESIUM SULFATE HEPTAHYDRATE 40 MG/ML
2 INJECTION, SOLUTION INTRAVENOUS CONTINUOUS
Status: DISCONTINUED | OUTPATIENT
Start: 2024-03-05 | End: 2024-03-07

## 2024-03-05 RX ORDER — SODIUM CHLORIDE, SODIUM LACTATE, POTASSIUM CHLORIDE, CALCIUM CHLORIDE 600; 310; 30; 20 MG/100ML; MG/100ML; MG/100ML; MG/100ML
75 INJECTION, SOLUTION INTRAVENOUS CONTINUOUS
Status: DISCONTINUED | OUTPATIENT
Start: 2024-03-05 | End: 2024-03-07

## 2024-03-05 RX ORDER — LABETALOL HYDROCHLORIDE 5 MG/ML
40 INJECTION, SOLUTION INTRAVENOUS ONCE
Status: COMPLETED | OUTPATIENT
Start: 2024-03-05 | End: 2024-03-05

## 2024-03-05 RX ORDER — NIFEDIPINE 10 MG/1
10 CAPSULE ORAL ONCE AS NEEDED
Status: DISCONTINUED | OUTPATIENT
Start: 2024-03-05 | End: 2024-03-06

## 2024-03-05 RX ORDER — CALCIUM GLUCONATE 98 MG/ML
1 INJECTION, SOLUTION INTRAVENOUS ONCE AS NEEDED
Status: DISCONTINUED | OUTPATIENT
Start: 2024-03-05 | End: 2024-03-07

## 2024-03-05 RX ORDER — METHYLERGONOVINE MALEATE 0.2 MG/ML
0.2 INJECTION INTRAVENOUS ONCE AS NEEDED
Status: DISCONTINUED | OUTPATIENT
Start: 2024-03-05 | End: 2024-03-06

## 2024-03-05 RX ORDER — METOCLOPRAMIDE HYDROCHLORIDE 5 MG/ML
10 INJECTION INTRAMUSCULAR; INTRAVENOUS EVERY 6 HOURS PRN
Status: DISCONTINUED | OUTPATIENT
Start: 2024-03-05 | End: 2024-03-09 | Stop reason: HOSPADM

## 2024-03-05 RX ORDER — LOPERAMIDE HYDROCHLORIDE 2 MG/1
4 CAPSULE ORAL EVERY 2 HOUR PRN
Status: DISCONTINUED | OUTPATIENT
Start: 2024-03-05 | End: 2024-03-06

## 2024-03-05 RX ORDER — TERBUTALINE SULFATE 1 MG/ML
0.25 INJECTION SUBCUTANEOUS ONCE AS NEEDED
Status: DISCONTINUED | OUTPATIENT
Start: 2024-03-05 | End: 2024-03-06

## 2024-03-05 RX ORDER — METOCLOPRAMIDE 10 MG/1
10 TABLET ORAL EVERY 6 HOURS PRN
Status: DISCONTINUED | OUTPATIENT
Start: 2024-03-05 | End: 2024-03-09 | Stop reason: HOSPADM

## 2024-03-05 RX ORDER — ONDANSETRON HYDROCHLORIDE 2 MG/ML
4 INJECTION, SOLUTION INTRAVENOUS EVERY 6 HOURS PRN
Status: DISCONTINUED | OUTPATIENT
Start: 2024-03-05 | End: 2024-03-06 | Stop reason: SDUPTHER

## 2024-03-05 RX ORDER — HYDRALAZINE HYDROCHLORIDE 20 MG/ML
5 INJECTION INTRAMUSCULAR; INTRAVENOUS ONCE AS NEEDED
Status: DISCONTINUED | OUTPATIENT
Start: 2024-03-05 | End: 2024-03-06

## 2024-03-05 RX ORDER — HYDRALAZINE HYDROCHLORIDE 20 MG/ML
5 INJECTION INTRAMUSCULAR; INTRAVENOUS ONCE AS NEEDED
Status: DISCONTINUED | OUTPATIENT
Start: 2024-03-05 | End: 2024-03-05 | Stop reason: SDUPTHER

## 2024-03-05 RX ORDER — CARBOPROST TROMETHAMINE 250 UG/ML
250 INJECTION, SOLUTION INTRAMUSCULAR ONCE AS NEEDED
Status: DISCONTINUED | OUTPATIENT
Start: 2024-03-05 | End: 2024-03-06

## 2024-03-05 RX ORDER — ONDANSETRON 4 MG/1
4 TABLET, FILM COATED ORAL EVERY 6 HOURS PRN
Status: DISCONTINUED | OUTPATIENT
Start: 2024-03-05 | End: 2024-03-06 | Stop reason: SDUPTHER

## 2024-03-05 RX ORDER — NIFEDIPINE 30 MG/1
30 TABLET, FILM COATED, EXTENDED RELEASE ORAL EVERY 24 HOURS
Status: DISCONTINUED | OUTPATIENT
Start: 2024-03-05 | End: 2024-03-05

## 2024-03-05 RX ORDER — ONDANSETRON 4 MG/1
4 TABLET, FILM COATED ORAL EVERY 6 HOURS PRN
Status: DISCONTINUED | OUTPATIENT
Start: 2024-03-05 | End: 2024-03-05 | Stop reason: SDUPTHER

## 2024-03-05 RX ORDER — PENICILLIN G 3000000 [IU]/50ML
3 INJECTION, SOLUTION INTRAVENOUS EVERY 4 HOURS
Status: DISCONTINUED | OUTPATIENT
Start: 2024-03-05 | End: 2024-03-06

## 2024-03-05 RX ADMIN — MAGNESIUM SULFATE HEPTAHYDRATE 2 G/HR: 40 INJECTION, SOLUTION INTRAVENOUS at 17:53

## 2024-03-05 RX ADMIN — NIFEDIPINE 30 MG: 30 TABLET, FILM COATED, EXTENDED RELEASE ORAL at 17:16

## 2024-03-05 RX ADMIN — NIFEDIPINE 30 MG: 30 TABLET, FILM COATED, EXTENDED RELEASE ORAL at 21:51

## 2024-03-05 RX ADMIN — Medication 2 MILLI-UNITS/MIN: at 22:46

## 2024-03-05 RX ADMIN — PENICILLIN G POTASSIUM 5 MILLION UNITS: 5000000 INJECTION, POWDER, FOR SOLUTION INTRAMUSCULAR; INTRAVENOUS at 16:35

## 2024-03-05 RX ADMIN — MAGNESIUM SULFATE HEPTAHYDRATE 2 G/HR: 40 INJECTION, SOLUTION INTRAVENOUS at 23:59

## 2024-03-05 RX ADMIN — PENICILLIN G 3 MILLION UNITS: 3000000 INJECTION, SOLUTION INTRAVENOUS at 21:06

## 2024-03-05 RX ADMIN — LABETALOL HYDROCHLORIDE 20 MG: 5 INJECTION, SOLUTION INTRAVENOUS at 17:22

## 2024-03-05 RX ADMIN — SODIUM CHLORIDE, POTASSIUM CHLORIDE, SODIUM LACTATE AND CALCIUM CHLORIDE 125 ML/HR: 600; 310; 30; 20 INJECTION, SOLUTION INTRAVENOUS at 16:12

## 2024-03-05 RX ADMIN — LABETALOL HYDROCHLORIDE 40 MG: 5 INJECTION, SOLUTION INTRAVENOUS at 18:22

## 2024-03-05 RX ADMIN — LABETALOL HYDROCHLORIDE 20 MG: 5 INJECTION, SOLUTION INTRAVENOUS at 22:22

## 2024-03-05 SDOH — SOCIAL STABILITY: SOCIAL INSECURITY: ABUSE SCREEN: ADULT

## 2024-03-05 SDOH — SOCIAL STABILITY: SOCIAL NETWORK: HOW OFTEN DO YOU ATTEND MEETINGS OF THE CLUBS OR ORGANIZATIONS YOU BELONG TO?: 1 TO 4 TIMES PER YEAR

## 2024-03-05 SDOH — ECONOMIC STABILITY: FOOD INSECURITY: WITHIN THE PAST 12 MONTHS, THE FOOD YOU BOUGHT JUST DIDN'T LAST AND YOU DIDN'T HAVE MONEY TO GET MORE.: NEVER TRUE

## 2024-03-05 SDOH — SOCIAL STABILITY: SOCIAL INSECURITY: HAS ANYONE EVER THREATENED TO HURT YOUR FAMILY OR YOUR PETS?: NO

## 2024-03-05 SDOH — ECONOMIC STABILITY: FOOD INSECURITY: WITHIN THE PAST 12 MONTHS, YOU WORRIED THAT YOUR FOOD WOULD RUN OUT BEFORE YOU GOT MONEY TO BUY MORE.: NEVER TRUE

## 2024-03-05 SDOH — SOCIAL STABILITY: SOCIAL INSECURITY: PHYSICAL ABUSE: DENIES

## 2024-03-05 SDOH — HEALTH STABILITY: MENTAL HEALTH: NON-SPECIFIC ACTIVE SUICIDAL THOUGHTS (PAST 1 MONTH): NO

## 2024-03-05 SDOH — ECONOMIC STABILITY: HOUSING INSECURITY: DO YOU FEEL UNSAFE GOING BACK TO THE PLACE WHERE YOU ARE LIVING?: NO

## 2024-03-05 SDOH — SOCIAL STABILITY: SOCIAL NETWORK: HOW OFTEN DO YOU ATTEND CHURCH OR RELIGIOUS SERVICES?: MORE THAN 4 TIMES PER YEAR

## 2024-03-05 SDOH — SOCIAL STABILITY: SOCIAL INSECURITY: ARE YOU MARRIED, WIDOWED, DIVORCED, SEPARATED, NEVER MARRIED, OR LIVING WITH A PARTNER?: NEVER MARRIED

## 2024-03-05 SDOH — SOCIAL STABILITY: SOCIAL INSECURITY: ARE THERE ANY APPARENT SIGNS OF INJURIES/BEHAVIORS THAT COULD BE RELATED TO ABUSE/NEGLECT?: NO

## 2024-03-05 SDOH — SOCIAL STABILITY: SOCIAL INSECURITY: DO YOU FEEL ANYONE HAS EXPLOITED OR TAKEN ADVANTAGE OF YOU FINANCIALLY OR OF YOUR PERSONAL PROPERTY?: NO

## 2024-03-05 SDOH — SOCIAL STABILITY: SOCIAL NETWORK
IN A TYPICAL WEEK, HOW MANY TIMES DO YOU TALK ON THE PHONE WITH FAMILY, FRIENDS, OR NEIGHBORS?: MORE THAN THREE TIMES A WEEK

## 2024-03-05 SDOH — HEALTH STABILITY: MENTAL HEALTH: HAVE YOU USED ANY SUBSTANCES (CANABIS, COCAINE, HEROIN, HALLUCINOGENS, INHALANTS, ETC.) IN THE PAST 12 MONTHS?: NO

## 2024-03-05 SDOH — ECONOMIC STABILITY: TRANSPORTATION INSECURITY
IN THE PAST 12 MONTHS, HAS THE LACK OF TRANSPORTATION KEPT YOU FROM MEDICAL APPOINTMENTS OR FROM GETTING MEDICATIONS?: NO

## 2024-03-05 SDOH — SOCIAL STABILITY: SOCIAL NETWORK: HOW OFTEN DO YOU GET TOGETHER WITH FRIENDS OR RELATIVES?: MORE THAN THREE TIMES A WEEK

## 2024-03-05 SDOH — SOCIAL STABILITY: SOCIAL INSECURITY: ARE YOU OR HAVE YOU BEEN THREATENED OR ABUSED PHYSICALLY, EMOTIONALLY, OR SEXUALLY BY ANYONE?: NO

## 2024-03-05 SDOH — SOCIAL STABILITY: SOCIAL INSECURITY: DOES ANYONE TRY TO KEEP YOU FROM HAVING/CONTACTING OTHER FRIENDS OR DOING THINGS OUTSIDE YOUR HOME?: NO

## 2024-03-05 SDOH — ECONOMIC STABILITY: FOOD INSECURITY: WITHIN THE PAST 12 MONTHS, YOU WORRIED THAT YOUR FOOD WOULD RUN OUT BEFORE YOU GOT THE MONEY TO BUY MORE.: NEVER TRUE

## 2024-03-05 SDOH — SOCIAL STABILITY: SOCIAL INSECURITY: HAVE YOU HAD THOUGHTS OF HARMING ANYONE ELSE?: NO

## 2024-03-05 SDOH — HEALTH STABILITY: MENTAL HEALTH: CURRENT SMOKER: 0

## 2024-03-05 SDOH — HEALTH STABILITY: MENTAL HEALTH: WISH TO BE DEAD (PAST 1 MONTH): NO

## 2024-03-05 SDOH — HEALTH STABILITY: PHYSICAL HEALTH: ON AVERAGE, HOW MANY DAYS PER WEEK DO YOU ENGAGE IN MODERATE TO STRENUOUS EXERCISE (LIKE A BRISK WALK)?: 0 DAYS

## 2024-03-05 SDOH — HEALTH STABILITY: MENTAL HEALTH: WERE YOU ABLE TO COMPLETE ALL THE BEHAVIORAL HEALTH SCREENINGS?: YES

## 2024-03-05 SDOH — SOCIAL STABILITY: SOCIAL NETWORK: ARE YOU MARRIED, WIDOWED, DIVORCED, SEPARATED, NEVER MARRIED, OR LIVING WITH A PARTNER?: NEVER MARRIED

## 2024-03-05 SDOH — ECONOMIC STABILITY: TRANSPORTATION INSECURITY: IN THE PAST 12 MONTHS, HAS LACK OF TRANSPORTATION KEPT YOU FROM MEDICAL APPOINTMENTS OR FROM GETTING MEDICATIONS?: NO

## 2024-03-05 SDOH — SOCIAL STABILITY: SOCIAL INSECURITY: VERBAL ABUSE: DENIES

## 2024-03-05 SDOH — SOCIAL STABILITY: SOCIAL NETWORK
DO YOU BELONG TO ANY CLUBS OR ORGANIZATIONS SUCH AS CHURCH GROUPS UNIONS, FRATERNAL OR ATHLETIC GROUPS, OR SCHOOL GROUPS?: NO

## 2024-03-05 SDOH — HEALTH STABILITY: MENTAL HEALTH: SUICIDAL BEHAVIOR (LIFETIME): NO

## 2024-03-05 SDOH — SOCIAL STABILITY: SOCIAL NETWORK
DO YOU BELONG TO ANY CLUBS OR ORGANIZATIONS SUCH AS CHURCH GROUPS, UNIONS, FRATERNAL OR ATHLETIC GROUPS, OR SCHOOL GROUPS?: NO

## 2024-03-05 SDOH — ECONOMIC STABILITY: TRANSPORTATION INSECURITY
IN THE PAST 12 MONTHS, HAS LACK OF TRANSPORTATION KEPT YOU FROM MEETINGS, WORK, OR FROM GETTING THINGS NEEDED FOR DAILY LIVING?: NO

## 2024-03-05 SDOH — HEALTH STABILITY: MENTAL HEALTH: HAVE YOU USED ANY PRESCRIPTION DRUGS OTHER THAN PRESCRIBED IN THE PAST 12 MONTHS?: NO

## 2024-03-05 SDOH — HEALTH STABILITY: PHYSICAL HEALTH: ON AVERAGE, HOW MANY MINUTES DO YOU ENGAGE IN EXERCISE AT THIS LEVEL?: 0 MIN

## 2024-03-05 SDOH — SOCIAL STABILITY: SOCIAL NETWORK: HOW OFTEN DO YOU ATTENT MEETINGS OF THE CLUB OR ORGANIZATION YOU BELONG TO?: 1 TO 4 TIMES PER YEAR

## 2024-03-05 ASSESSMENT — PAIN SCALES - GENERAL
PAINLEVEL_OUTOF10: 0 - NO PAIN
PAINLEVEL: 0 - NO PAIN
PAINLEVEL_OUTOF10: 0 - NO PAIN
PAINLEVEL_OUTOF10: 1
PAINLEVEL_OUTOF10: 1

## 2024-03-05 ASSESSMENT — LIFESTYLE VARIABLES
HOW MANY STANDARD DRINKS CONTAINING ALCOHOL DO YOU HAVE ON A TYPICAL DAY: PATIENT DOES NOT DRINK
SKIP TO QUESTIONS 9-10: 1
HOW OFTEN DO YOU HAVE A DRINK CONTAINING ALCOHOL: NEVER
SKIP TO QUESTIONS 9-10: 1
HOW OFTEN DO YOU HAVE 6 OR MORE DRINKS ON ONE OCCASION: NEVER
HOW OFTEN DO YOU HAVE 6 OR MORE DRINKS ON ONE OCCASION: NEVER
HOW MANY STANDARD DRINKS CONTAINING ALCOHOL DO YOU HAVE ON A TYPICAL DAY: PATIENT DOES NOT DRINK
HOW OFTEN DO YOU HAVE A DRINK CONTAINING ALCOHOL: NEVER
AUDIT-C TOTAL SCORE: 0

## 2024-03-05 ASSESSMENT — PATIENT HEALTH QUESTIONNAIRE - PHQ9
SUM OF ALL RESPONSES TO PHQ9 QUESTIONS 1 & 2: 0
1. LITTLE INTEREST OR PLEASURE IN DOING THINGS: NOT AT ALL
2. FEELING DOWN, DEPRESSED OR HOPELESS: NOT AT ALL
SUM OF ALL RESPONSES TO PHQ9 QUESTIONS 1 & 2: 0
2. FEELING DOWN, DEPRESSED OR HOPELESS: NOT AT ALL
1. LITTLE INTEREST OR PLEASURE IN DOING THINGS: NOT AT ALL

## 2024-03-05 ASSESSMENT — ACTIVITIES OF DAILY LIVING (ADL)
LACK_OF_TRANSPORTATION: NO
LACK_OF_TRANSPORTATION: NO

## 2024-03-05 NOTE — SIGNIFICANT EVENT
Labor check    S: Patient resting comfortably. Feeling increased pelvic pressure and more painful ctx.     SVE: /-3  Patient was placed in dorsal lithotomy position and a cervical ripening balloon was guided through the external and internal cervical os with a gloved hand . The balloon was inflated with 60cc of normal saline. Pt tolerated the procedure well.    FHT: 140/mod/+accel/-decel  [place]: q2min    A/P: Thang is a 33 y.o.  at 35w6d undergoing IOL in the setting of placental abruption, PEC w/o SF, and FGR.    IOL  - latent labor  - minimal cervical changes since last SVE, recommend CRB IOL in the setting of abruption  - CRB placed, lela frequently, no additionally agent added  - GBS unknown, pending. Continue PCN  - Desires non-medicated birth. Per anesthesia ok for epidural if desires (h/o spinal surgery)    Abruption  - hgb 10.7 on admission, T&C x2U  - coags/fibrinogen wnl  - minimal continued bleeding  - trend labs in 6hrs    PEC w/o SF  - mild ranges and 1x severe on arrival  - HELLP labs neg x1, P:C 5.23  - asx   - discussed diagnosis with patient and recommendation for magnesium if sPEC is diagnosed during labor course or postpartum    Fetal Wellbeing  - CEFM, Cat I currently     Breanna Zavala MD  PGY-2, Obstetrics and Gynecology

## 2024-03-05 NOTE — ANESTHESIA PREPROCEDURE EVALUATION
Patient: Thang Holland    Evaluation Method: In-person visit    Procedure Information    Date: 24  Procedure: Labor Consult      IOL for c/f abruption    Relevant Problems   Neuro/Psych   (+) Anxiety      Hematology   (+) Anemia complicating pregnancy, third trimester      Musculoskeletal   (+) Idiopathic scoliosis and kyphoscoliosis   (+) Scoliosis   S/p surgical repair with instrumentation T5-L1 in  at [facility] per chart review. No operative reports seen, no xrays seen in PACS. Does not follow with [facility] at this time    Clinical information reviewed:   Tobacco  Allergies  Meds   Med Hx  Surg Hx   Fam Hx  Soc Hx        NPO Detail:  NPO/Void Status  Date of Last Liquid: 24  Time of Last Liquid: 1200  Date of Last Solid: 24  Time of Last Solid: 1200         OB/Gyn Evaluation    Present Pregnancy    Patient is pregnant now.  (+) , placental abruption, anticoagulation use during pregnancy (asa)   Obstetric History                Physical Exam    Airway  Mallampati: II  TM distance: >3 FB  Neck ROM: full     Cardiovascular   Rhythm: regular  Rate: normal     Dental - normal exam     Pulmonary   Breath sounds clear to auscultation     Abdominal      Other findings: Well healed midline scar on back          Anesthesia Plan    History of general anesthesia?: yes  History of complications of general anesthesia?: no    ASA 3     other   (Does not want epidural at this time)  The patient is not a current smoker.  Patient did not smoke on day of procedure.    Anesthetic plan and risks discussed with patient (discussed risks and benefits of neuraxial and general anesthesia. specifically increased risk of difficult placement, wet tap, pdph, and patchy epidural d/t prior scoliosis and surgery with instrumentation).  Use of blood products discussed with patient who consented to blood products.    Plan discussed with attending.    Would like to proceed with ncb, in the event of c section she prefers general  anesthesia

## 2024-03-05 NOTE — H&P
Obstetrical Admission History and Physical     Thang Holland is a 33 y.o. . 35w6d by LMP c/w 18wk US, presents for VB.     Chief Complaint: Vaginal Bleeding    Assessment/Plan    Placental abruption  - s/p prior admission for abruption this pregnancy  - ~5cc bright red blood in vault, patient lela q2min, consistent with abruption  - cbc, coags, fibrinogen pending  - PRBC 2U prepared  - NPO pending labs  - Given second bleed this pregnancy, BRB on exam, frequent contractions, and elevated Bps as below in late  pregnancy, would recommend admission and IOL  - Os closed, plan to reassess for cervical change shortly, if unchanged kim induce with CRB  - MFM aware, agrees with plan for IOL    Gestational hypertension  - newly diagnosed on arrival with mild range BP during prior admission and again today, 1x severe range  - cycling BP q15 min  - P:C pending, HELLP labs pending   - no PEC sxs     Fetal Status, FGR  - CEFM, Cat I  - GBS pending 3/5  - BMZ -, discussed risks/benefits of rBMZ at late  gestation, patient declines   - EFW () 1754 (2%), AC 3%, nl dopplers; BPP 8/8 on admission    Postpartum planning  - ppBC: declines        Principal Problem:    Labor and delivery indication for care or intervention      Pregnancy Problems (from 23 to present)       Problem Noted Resolved    Vaginal bleeding in pregnancy 3/5/2024 by Tere Garcia, RN No    Priority:  Medium      Labor and delivery indication for care or intervention 3/5/2024 by Rj Toney, DO No    Priority:  Medium      Placental abruption in third trimester 2024 by Sami Sweeney MD No    Priority:  Medium      Anemia complicating pregnancy, third trimester 2024 by Sami Sweeney MD No    Priority:  Medium      Poor fetal growth affecting management of mother in third trimester 2024 by Sami Sweeney MD No    Priority:  Medium      Pyelectasis of fetus on prenatal ultrasound 2024 by Sami  MD Patel No    Priority:  Medium      Overview Addendum 2024  2:38 PM by Sami Sweeney MD     Noted at US on 24  Left: 7.6 mm  Also noted to have PRUV.          Labor and delivery indication for care or intervention 2024 by Savanna Irving MD 2024 by Sami Sweeney MD          Gabby Desir is here complaining of vaginal bleeding. Good fetal movement. Approximately 5cc bright red blood in vault with a closed os    Thang Holland is a 32 yo  at 35w6d who presents to [facility] with vaginal bleeding. Patient reports that she was at work and felt some fluid draining from her vagina and realized it was blood when she made it to the bathroom to check. She reports that is was heavy bleeding soaking thorough clothes at the time but has since slowed to spotting. She denies pain to her abdomen, nausea or vomiting, changes in vision or tinnitus, changes in strength or sensation, SOB, chest pain, right-sided upper quadrant pain, or headaches. Patient states that she had bleeding on  and was admitted for a few days with concern for abruption. While there she was given dexamethasone. Patient was also noted to have IUGR of the fetus.  Patient denies any recent trauma or changes and states the only drugs she takes are prenatal and aspirin 81.     Pregnancy notable for:   - admission  for abruption, s/p BMZ -  - FGR, EFW () 1754 (2%), AC 3%, nl dopplers  - HgbC trait  - 1x mild range during prior admission, no h/o HTN  - anxiety, not currently on meds  - h/o scoliosis surgery with saul fixation T5-L2       Obstetrical History   OB History    Para Term  AB Living   1 0 0 0 0 0   SAB IAB Ectopic Multiple Live Births   0 0 0 0 0      # Outcome Date GA Lbr Kevin/2nd Weight Sex Delivery Anes PTL Lv   1 Current                Past Medical History  No past medical history on file.     Past Surgical History   Past Surgical History:   Procedure Laterality Date    OTHER SURGICAL  HISTORY  09/21/2020    Spinal surgery       Social History  Social History     Tobacco Use    Smoking status: Never    Smokeless tobacco: Never   Substance Use Topics    Alcohol use: Never     Substance and Sexual Activity   Drug Use Never       Allergies  Patient has no known allergies.     Medications  Medications Prior to Admission   Medication Sig Dispense Refill Last Dose    aspirin 81 mg chewable tablet CHEW 2 TABLETS (162 MG) ONCE DAILY. 180 tablet 4     ergocalciferol (Vitamin D-2) 1.25 MG (36550 UT) capsule Take 1 capsule (1,250 mcg) by mouth 1 (one) time per week.       ferrous sulfate 325 (65 Fe) MG EC tablet Take 1 tablet by mouth 2 times a day with meals. Do not crush, chew, or split. 60 tablet 11     prenatal no115/iron/folic acid (PRENATAL 19 ORAL) Take by mouth.          Objective    Last Vitals  Temp Pulse Resp BP MAP O2 Sat   36.6 °C (97.9 °F) 81 (!) 1 (!) 155/94   100 %     Physical Examination  GENERAL: Examination reveals a well developed, well nourished, gravid female in no acute distress. She is alert and cooperative.  LUNGS: clear to auscultation bilaterally  HEART: regular rate and rhythm, S1, S2 normal, no murmur, click, rub or gallop  ABDOMEN: soft, gravid, nontender, nondistended, no abnormal masses, no epigastric pain  VAGINA: normal appearing vagina with normal color and no lesions noted and ~5cc bloody fluid in the vault  CERVIX: Closed cm dilated, 40 % effaced, -3 station and evaluated by sterile speculum exam; MEMBRANES are    NEUROLOGICAL: alert, oriented, normal speech, no focal findings or movement disorder noted  PSYCHOLOGICAL: awake and alert; oriented to person, place, and time        BSUS: cephalic fetus, MVP 6cm, BPP 8/8  SSE: 5cc bright red blood in the vault, bloody cervical mucus, no active bleeding from the cervix  SVE: 0/40/-3     FHT: 140/mod/+acel/-decel  [place]: q2min     Lab Review  Labs in chart were reviewed.    Patient discussed with Dr. Zavala and   Raphael Toney, DO  Emergency Medicine PGY1

## 2024-03-05 NOTE — PROGRESS NOTES
Intrapartum Progress Note    Assessment/Plan   Thang Holland is a 33 y.o.  at 35w6d. LB: 4/3/2024, by Last Menstrual Period.     Latent labor  - CRB in place, lela frequently, no additionally agent added  - will start pitocin if ctx space  - GBS unknown, pending. Continue PCN  - Desires non-medicated birth. Per anesthesia ok for epidural if desires (h/o spinal surgery)    Abruption  - hgb 10.7 on admission, T&C x2U  - coags/fibrinogen wnl  - minimal continued bleeding  - fu labs in 6hrs    sPEC  - severe Bps req IV tx  - IV lab   - HELLP labs wnl x1, P:C 5.23  - nifed 30 (3/5)  - Mg gtt @2g    Seen and discussed with Dr. Asya Nielson MD PGY-1        Principal Problem:    Labor and delivery indication for care or intervention    Pregnancy Problems (from 23 to present)       Problem Noted Resolved    Vaginal bleeding in pregnancy 3/5/2024 by Tere Garcia RN No    Priority:  Medium      Labor and delivery indication for care or intervention 3/5/2024 by Rj Toney DO No    Priority:  Medium      Placental abruption in third trimester 2024 by Sami Sweeney MD No    Priority:  Medium      Anemia complicating pregnancy, third trimester 2024 by Sami Sweeney MD No    Priority:  Medium      Poor fetal growth affecting management of mother in third trimester 2024 by Sami Sweeney MD No    Priority:  Medium      Pyelectasis of fetus on prenatal ultrasound 2024 by Sami Sweeney MD No    Priority:  Medium      Overview Addendum 2024  2:38 PM by Sami Sweeney MD     Noted at US on 24  Left: 7.6 mm  Also noted to have PRUV.          Labor and delivery indication for care or intervention 2024 by Savanna Irving MD 2024 by Sami Sweeney MD            Subjective   Pt feeling well. Patient does not report headaches, visual changes, chest pain, shortness of breath or RUQ pain      Objective   Last Vitals:  Temp Pulse Resp BP MAP Pulse Ox   36.8  °C (98.2 °F) 73 18 (!) 151/95   100 %     Vitals Min/Max Last 24 Hours:  Temp  Min: 36.6 °C (97.9 °F)  Max: 36.8 °C (98.2 °F)  Pulse  Min: 73  Max: 85  Resp  Min: 1  Max: 18  BP  Min: 141/84  Max: 184/117    Intake/Output:  No intake or output data in the 24 hours ending 03/05/24 1846    Physical Examination:  Last SVE: 1/60/-3; crb in place  FHT: 130/mod/+accels/-decels    Lab Review:  Lab Results   Component Value Date    WBC 9.4 03/05/2024    HGB 10.7 (L) 03/05/2024    HCT 29.8 (L) 03/05/2024     03/05/2024     Lab Results   Component Value Date    GLUCOSE 75 03/05/2024     03/05/2024    K 4.5 03/05/2024     03/05/2024    CO2 20 (L) 03/05/2024    ANIONGAP 14 03/05/2024    BUN 11 03/05/2024    CREATININE 0.65 03/05/2024    EGFR >90 03/05/2024    CALCIUM 9.1 03/05/2024    ALBUMIN 2.8 (L) 03/05/2024    PROT 5.7 (L) 03/05/2024    ALKPHOS 155 (H) 03/05/2024    ALT 27 03/05/2024    AST 34 03/05/2024    BILITOT 0.2 03/05/2024     Lab Results   Component Value Date    APTT 31 03/05/2024    PROTIME 9.9 03/05/2024    INR 0.9 03/05/2024     Lab Results   Component Value Date    FIBRINOGEN 578 (H) 03/05/2024

## 2024-03-05 NOTE — TELEPHONE ENCOUNTER
Ms. Holland called office today at 35.6 wga to report gushing of vaginal bleeding and cramping. Her pregnancy is complicated by fetal growth restriction and concerns for placental abruption admitted 1/28 for acute vaginal bleeding. Report called to [facility] triage nurse Macey, and L&D Resident Ramiro Farah MD.  Tere QUIGLEY, RN, CLC

## 2024-03-06 PROCEDURE — 7210000002 HC LABOR PER HOUR

## 2024-03-06 PROCEDURE — 1210000001 HC SEMI-PRIVATE ROOM DAILY

## 2024-03-06 PROCEDURE — 2500000004 HC RX 250 GENERAL PHARMACY W/ HCPCS (ALT 636 FOR OP/ED)

## 2024-03-06 PROCEDURE — 2500000002 HC RX 250 W HCPCS SELF ADMINISTERED DRUGS (ALT 637 FOR MEDICARE OP, ALT 636 FOR OP/ED): Performed by: OBSTETRICS & GYNECOLOGY

## 2024-03-06 PROCEDURE — 88307 TISSUE EXAM BY PATHOLOGIST: CPT | Mod: TC,SUR

## 2024-03-06 PROCEDURE — 3E033VJ INTRODUCTION OF OTHER HORMONE INTO PERIPHERAL VEIN, PERCUTANEOUS APPROACH: ICD-10-PCS

## 2024-03-06 PROCEDURE — 10907ZC DRAINAGE OF AMNIOTIC FLUID, THERAPEUTIC FROM PRODUCTS OF CONCEPTION, VIA NATURAL OR ARTIFICIAL OPENING: ICD-10-PCS | Performed by: STUDENT IN AN ORGANIZED HEALTH CARE EDUCATION/TRAINING PROGRAM

## 2024-03-06 PROCEDURE — 2500000004 HC RX 250 GENERAL PHARMACY W/ HCPCS (ALT 636 FOR OP/ED): Performed by: STUDENT IN AN ORGANIZED HEALTH CARE EDUCATION/TRAINING PROGRAM

## 2024-03-06 PROCEDURE — 7100000016 HC LABOR RECOVERY PER HOUR

## 2024-03-06 PROCEDURE — 88307 TISSUE EXAM BY PATHOLOGIST: CPT | Performed by: PATHOLOGY

## 2024-03-06 PROCEDURE — 59410 OBSTETRICAL CARE: CPT | Performed by: STUDENT IN AN ORGANIZED HEALTH CARE EDUCATION/TRAINING PROGRAM

## 2024-03-06 PROCEDURE — 0KQM0ZZ REPAIR PERINEUM MUSCLE, OPEN APPROACH: ICD-10-PCS | Performed by: OBSTETRICS & GYNECOLOGY

## 2024-03-06 PROCEDURE — 59409 OBSTETRICAL CARE: CPT | Performed by: OBSTETRICS & GYNECOLOGY

## 2024-03-06 PROCEDURE — 99199 UNLISTED SPECIAL SVC PX/RPRT: CPT

## 2024-03-06 PROCEDURE — 2500000001 HC RX 250 WO HCPCS SELF ADMINISTERED DRUGS (ALT 637 FOR MEDICARE OP)

## 2024-03-06 RX ORDER — LIDOCAINE 560 MG/1
1 PATCH PERCUTANEOUS; TOPICAL; TRANSDERMAL
Status: DISCONTINUED | OUTPATIENT
Start: 2024-03-06 | End: 2024-03-09 | Stop reason: HOSPADM

## 2024-03-06 RX ORDER — ONDANSETRON 4 MG/1
4 TABLET, FILM COATED ORAL EVERY 6 HOURS PRN
Status: DISCONTINUED | OUTPATIENT
Start: 2024-03-06 | End: 2024-03-09 | Stop reason: HOSPADM

## 2024-03-06 RX ORDER — OXYTOCIN 10 [USP'U]/ML
10 INJECTION, SOLUTION INTRAMUSCULAR; INTRAVENOUS ONCE AS NEEDED
Status: DISCONTINUED | OUTPATIENT
Start: 2024-03-06 | End: 2024-03-09 | Stop reason: HOSPADM

## 2024-03-06 RX ORDER — LOPERAMIDE HYDROCHLORIDE 2 MG/1
4 CAPSULE ORAL EVERY 2 HOUR PRN
Status: DISCONTINUED | OUTPATIENT
Start: 2024-03-06 | End: 2024-03-09 | Stop reason: HOSPADM

## 2024-03-06 RX ORDER — OXYTOCIN 10 [USP'U]/ML
10 INJECTION, SOLUTION INTRAMUSCULAR; INTRAVENOUS ONCE AS NEEDED
Status: DISCONTINUED | OUTPATIENT
Start: 2024-03-06 | End: 2024-03-06

## 2024-03-06 RX ORDER — ADHESIVE BANDAGE
10 BANDAGE TOPICAL
Status: DISCONTINUED | OUTPATIENT
Start: 2024-03-06 | End: 2024-03-09 | Stop reason: HOSPADM

## 2024-03-06 RX ORDER — MORPHINE SULFATE 2 MG/ML
2 INJECTION, SOLUTION INTRAMUSCULAR; INTRAVENOUS ONCE
Status: COMPLETED | OUTPATIENT
Start: 2024-03-06 | End: 2024-03-06

## 2024-03-06 RX ORDER — IBUPROFEN 600 MG/1
600 TABLET ORAL EVERY 6 HOURS
Status: DISCONTINUED | OUTPATIENT
Start: 2024-03-06 | End: 2024-03-09 | Stop reason: HOSPADM

## 2024-03-06 RX ORDER — OXYTOCIN/0.9 % SODIUM CHLORIDE 30/500 ML
60 PLASTIC BAG, INJECTION (ML) INTRAVENOUS ONCE AS NEEDED
Status: DISCONTINUED | OUTPATIENT
Start: 2024-03-06 | End: 2024-03-06

## 2024-03-06 RX ORDER — NIFEDIPINE 60 MG/1
60 TABLET, FILM COATED, EXTENDED RELEASE ORAL EVERY 24 HOURS
Status: DISCONTINUED | OUTPATIENT
Start: 2024-03-06 | End: 2024-03-08

## 2024-03-06 RX ORDER — TRANEXAMIC ACID 100 MG/ML
1000 INJECTION, SOLUTION INTRAVENOUS ONCE AS NEEDED
Status: DISCONTINUED | OUTPATIENT
Start: 2024-03-06 | End: 2024-03-09 | Stop reason: HOSPADM

## 2024-03-06 RX ORDER — POLYETHYLENE GLYCOL 3350 17 G/17G
17 POWDER, FOR SOLUTION ORAL 2 TIMES DAILY PRN
Status: DISCONTINUED | OUTPATIENT
Start: 2024-03-06 | End: 2024-03-09 | Stop reason: HOSPADM

## 2024-03-06 RX ORDER — SIMETHICONE 80 MG
80 TABLET,CHEWABLE ORAL 4 TIMES DAILY PRN
Status: DISCONTINUED | OUTPATIENT
Start: 2024-03-06 | End: 2024-03-09 | Stop reason: HOSPADM

## 2024-03-06 RX ORDER — CARBOPROST TROMETHAMINE 250 UG/ML
250 INJECTION, SOLUTION INTRAMUSCULAR ONCE AS NEEDED
Status: DISCONTINUED | OUTPATIENT
Start: 2024-03-06 | End: 2024-03-09 | Stop reason: HOSPADM

## 2024-03-06 RX ORDER — METHYLERGONOVINE MALEATE 0.2 MG/ML
0.2 INJECTION INTRAVENOUS ONCE AS NEEDED
Status: DISCONTINUED | OUTPATIENT
Start: 2024-03-06 | End: 2024-03-09 | Stop reason: HOSPADM

## 2024-03-06 RX ORDER — ONDANSETRON HYDROCHLORIDE 2 MG/ML
4 INJECTION, SOLUTION INTRAVENOUS EVERY 6 HOURS PRN
Status: DISCONTINUED | OUTPATIENT
Start: 2024-03-06 | End: 2024-03-09 | Stop reason: HOSPADM

## 2024-03-06 RX ORDER — NIFEDIPINE 10 MG/1
10 CAPSULE ORAL ONCE AS NEEDED
Status: DISCONTINUED | OUTPATIENT
Start: 2024-03-06 | End: 2024-03-09 | Stop reason: HOSPADM

## 2024-03-06 RX ORDER — ACETAMINOPHEN 325 MG/1
975 TABLET ORAL EVERY 6 HOURS
Status: DISCONTINUED | OUTPATIENT
Start: 2024-03-06 | End: 2024-03-09 | Stop reason: HOSPADM

## 2024-03-06 RX ORDER — LABETALOL HYDROCHLORIDE 5 MG/ML
20 INJECTION, SOLUTION INTRAVENOUS ONCE AS NEEDED
Status: DISCONTINUED | OUTPATIENT
Start: 2024-03-06 | End: 2024-03-09 | Stop reason: HOSPADM

## 2024-03-06 RX ORDER — MISOPROSTOL 200 UG/1
800 TABLET ORAL ONCE AS NEEDED
Status: DISCONTINUED | OUTPATIENT
Start: 2024-03-06 | End: 2024-03-09 | Stop reason: HOSPADM

## 2024-03-06 RX ORDER — HYDRALAZINE HYDROCHLORIDE 20 MG/ML
5 INJECTION INTRAMUSCULAR; INTRAVENOUS ONCE AS NEEDED
Status: DISCONTINUED | OUTPATIENT
Start: 2024-03-06 | End: 2024-03-09 | Stop reason: HOSPADM

## 2024-03-06 RX ORDER — DIPHENHYDRAMINE HCL 25 MG
25 CAPSULE ORAL EVERY 6 HOURS PRN
Status: DISCONTINUED | OUTPATIENT
Start: 2024-03-06 | End: 2024-03-09 | Stop reason: HOSPADM

## 2024-03-06 RX ORDER — BISACODYL 10 MG/1
10 SUPPOSITORY RECTAL DAILY PRN
Status: DISCONTINUED | OUTPATIENT
Start: 2024-03-06 | End: 2024-03-09 | Stop reason: HOSPADM

## 2024-03-06 RX ORDER — DIPHENHYDRAMINE HYDROCHLORIDE 50 MG/ML
25 INJECTION INTRAMUSCULAR; INTRAVENOUS EVERY 6 HOURS PRN
Status: DISCONTINUED | OUTPATIENT
Start: 2024-03-06 | End: 2024-03-09 | Stop reason: HOSPADM

## 2024-03-06 RX ADMIN — Medication 60 MILLI-UNITS/MIN: at 05:11

## 2024-03-06 RX ADMIN — PENICILLIN G 3 MILLION UNITS: 3000000 INJECTION, SOLUTION INTRAVENOUS at 00:33

## 2024-03-06 RX ADMIN — IBUPROFEN 600 MG: 600 TABLET ORAL at 19:54

## 2024-03-06 RX ADMIN — ACETAMINOPHEN 975 MG: 325 TABLET ORAL at 19:54

## 2024-03-06 RX ADMIN — MAGNESIUM SULFATE HEPTAHYDRATE 2 G/HR: 40 INJECTION, SOLUTION INTRAVENOUS at 19:27

## 2024-03-06 RX ADMIN — ACETAMINOPHEN 975 MG: 325 TABLET ORAL at 08:43

## 2024-03-06 RX ADMIN — MORPHINE SULFATE 2 MG: 2 INJECTION, SOLUTION INTRAMUSCULAR; INTRAVENOUS at 05:27

## 2024-03-06 RX ADMIN — MAGNESIUM SULFATE HEPTAHYDRATE 2 G/HR: 40 INJECTION, SOLUTION INTRAVENOUS at 09:17

## 2024-03-06 RX ADMIN — PENICILLIN G 3 MILLION UNITS: 3000000 INJECTION, SOLUTION INTRAVENOUS at 04:14

## 2024-03-06 RX ADMIN — NIFEDIPINE 60 MG: 60 TABLET, FILM COATED, EXTENDED RELEASE ORAL at 08:43

## 2024-03-06 RX ADMIN — ONDANSETRON 4 MG: 2 INJECTION INTRAMUSCULAR; INTRAVENOUS at 04:11

## 2024-03-06 RX ADMIN — IBUPROFEN 600 MG: 600 TABLET ORAL at 08:43

## 2024-03-06 RX ADMIN — SODIUM CHLORIDE, POTASSIUM CHLORIDE, SODIUM LACTATE AND CALCIUM CHLORIDE 75 ML/HR: 600; 310; 30; 20 INJECTION, SOLUTION INTRAVENOUS at 19:27

## 2024-03-06 ASSESSMENT — PAIN SCALES - GENERAL
PAINLEVEL_OUTOF10: 2
PAINLEVEL_OUTOF10: 0 - NO PAIN
PAINLEVEL_OUTOF10: 5 - MODERATE PAIN
PAINLEVEL_OUTOF10: 1
PAINLEVEL_OUTOF10: 0 - NO PAIN
PAINLEVEL_OUTOF10: 0 - NO PAIN

## 2024-03-06 ASSESSMENT — PAIN DESCRIPTION - DESCRIPTORS
DESCRIPTORS: ACHING;BURNING;CRAMPING;SORE
DESCRIPTORS: CRAMPING

## 2024-03-06 NOTE — L&D DELIVERY NOTE
OB Delivery Note  3/6/2024  Thang Holland  33 y.o.   Vaginal, Spontaneous        Gestational Age: 36w0d  /Para:   Quantitative Blood Loss: Admission to Discharge: 350 mL (3/5/2024 12:51 PM - 3/6/2024  6:41 AM)    Xiao Holland [[id number]]      Labor Events    Rupture date/time: 3/6/2024 0050  Rupture type: Artificial  Fluid color: Clear  Fluid odor: None  Labor type: Induced Onset of Labor  Labor allowed to proceed with plans for an attempted vaginal birth?: Yes  Induction: Oxytocin  Complications: None       Labor Event Times    Dilation complete date/time: 3/6/2024 050  Start pushing date/time: 3/6/2024 050       Labor Length    2nd stage: 0h 02m  3rd stage: 0h 46m       Placenta    Placenta delivery date/time: 3/6/2024 0553  Placenta removal: Expressed  Placenta appearance: Intact  Placenta disposition: pathology       Cord    Vessels: 3 vessels  Complications: None  Delayed cord clamping?: Yes  Gases sent?: No  Stem cell collection (by provider): No       Lacerations    Perineal laceration: 2nd  Other lacerations?: No  Repair suture: 2-0 Synthetic Suture       Anesthesia    Method: None       Operative Delivery    Forceps attempted?: No  Vacuum extractor attempted?: No       Shoulder Dystocia    Shoulder dystocia present?: No        Delivery    Time head delivered: 3/6/2024 05:07:00  Birth date/time: 3/6/2024 05:07:00  Delivery type: Vaginal, Spontaneous  Complications: None       Resuscitation    Method: None       Apgars    Living status: Living  Apgar Component Scores:  1 min.:  5 min.:  10 min.:  15 min.:  20 min.:    Skin color:  1  1       Heart rate:  2  2       Reflex irritability:  2  2       Muscle tone:  2  2       Respiratory effort:  2  2       Total:  9  9              Delivery Providers    Delivering clinician: Gay Berry MD   Provider Role    Tara Solorio RN Delivery Nurse    Kayla Coles RN Nursery Nurse    Bozena Hawkins MD Resident               Placenta  firmly in place 15 minutes after delivery. Given lack of descent with traction, bimanual performed. Lower uterine segment firmly clamped and contracted. Patient given morphine and manual placental extraction attempted, but unable to traverse the lower uterine segment. Decision made to administer nitroglycerin for uterine relaxation and temporarily pause pitocin. Anesthesia administered nitroglycerin at bedside, and placenta delivered shortly after. Pitocin resumed. 2nd degree laceration repaired.     Bozena Hawkins MD

## 2024-03-06 NOTE — SIGNIFICANT EVENT
Patient meets criteria for home monitoring of blood pressure post discharge.  Met with patient to assess for availability of home BP monitor.  Patient does not have access to BP monitor at home and declined obtaining BP monitor from [employer]. Prescription sent to her pharmacy of choice for BP monitor and patient verbalized responsibility for obtaining her own BP monitor after discharge.  Patient educated on importance of continuing to monitor BP at home, recording BP on home monitoring log and s/sx of when to call her provider.  Pt verbalized understanding the above information.

## 2024-03-06 NOTE — SIGNIFICANT EVENT
Cervical Exam  Dilation: 5  Effacement (%): 50  Fetal Station: -3  OB Examiner: Ross  Fetal Assessment  Movement: Present  Mode: External US  Doppler/Fetoscope Rate: 130 BPM  Baseline Fetal Heart Rate (bpm): 135 bpm  Baseline Classification: Normal  Variability: Moderate (Between 6 and 25 BPM)  Pattern: Accelerations  FHR Category: Category I     Arom to clear fluid. sPEC: on Nifed 60 mg. PEC labs neg x2. No bleeding currently.     Bozena Hawkins MD

## 2024-03-06 NOTE — PROGRESS NOTES
Postpartum Progress Note    Assessment/Plan   Thang Holland is a 33 y.o., , who delivered at 36w0d gestation and is now postpartum day 0.    sPEC  Nifed 60mg, none since last night.  Mild range Bps now.  Will give nifed 60 dose now.  Denies symptoms.  Continue magnesium until 24 hours pp.  Stable to transfer to [facility]    Principal Problem:    Labor and delivery indication for care or intervention    Pregnancy Problems (from 23 to present)       Problem Noted Resolved    Vaginal bleeding in pregnancy 3/5/2024 by Tere Garcia RN No    Labor and delivery indication for care or intervention 3/5/2024 by Rj Toney DO No    Placental abruption in third trimester 2024 by Sami Sweeney MD No    Anemia complicating pregnancy, third trimester 2024 by Sami Sweeney MD No    Poor fetal growth affecting management of mother in third trimester 2024 by Sami Sweeney MD No    Pyelectasis of fetus on prenatal ultrasound 2024 by Sami Sweeney MD No    Overview Addendum 2024  2:38 PM by Sami Sweeney MD     Noted at US on 24  Left: 7.6 mm  Also noted to have PRUV.          Labor and delivery indication for care or intervention 2024 by Savanna Irving MD 2024 by Sami Sweeney MD          Hospital course:  sPEC      Subjective   Her pain is well controlled with current medications  She is passing flatus  She is not ambulating well (on mag, bedrest)  She is not tolerating a Adult diet Regular  She reports no breast or nursing problems  She denies emotional concerns today   Her plan for contraception is none     Denies h/A, CP, SOB, visual changes, pain under right breast    Objective   Allergies:   Patient has no known allergies.         Last Vitals:  Temp Pulse Resp BP MAP Pulse Ox   36.6 °C (97.9 °F) 85 18 (!) 146/93   100 %     Vitals Min/Max Last 24 Hours:  Temp  Min: 36.6 °C (97.9 °F)  Max: 36.8 °C (98.2 °F)  Pulse  Min: 71  Max: 94  Resp  Min: 1  Max: 18  BP  Min:  130/86  Max: 184/117    Intake/Output:     Intake/Output Summary (Last 24 hours) at 3/6/2024 0827  Last data filed at 3/6/2024 0803  Gross per 24 hour   Intake 2845.14 ml   Output 1505 ml   Net 1340.14 ml       Physical Exam:  General: Examination reveals a well developed, well nourished, female, in no acute distress. She is alert and cooperative.  Abdomen: soft, gravid, nontender, nondistended, no abnormal masses, no epigastric pain.  Fundus: firm.  Extremities: no redness or tenderness in the calves or thighs, no edema.    Lab Data:  Lab Results   Component Value Date    WBC 12.6 (H) 03/05/2024    HGB 11.3 (L) 03/05/2024    HCT 31.7 (L) 03/05/2024     03/05/2024     Lab Results   Component Value Date    GLUCOSE 138 (H) 03/05/2024    CALCIUM 8.5 (L) 03/05/2024     (L) 03/05/2024    K 4.0 03/05/2024    CO2 18 (L) 03/05/2024     03/05/2024    BUN 10 03/05/2024    CREATININE 0.62 03/05/2024     Lab Results   Component Value Date    ALT 28 03/05/2024    AST 30 03/05/2024    ALKPHOS 171 (H) 03/05/2024    BILITOT 0.2 03/05/2024

## 2024-03-06 NOTE — DISCHARGE INSTRUCTIONS

## 2024-03-06 NOTE — CARE PLAN
The patient's goals for the shift include   Problem: Pain - Adult  Goal: Verbalizes/displays adequate comfort level or baseline comfort level  Outcome: Progressing     Problem: Safety - Adult  Goal: Free from fall injury  Outcome: Progressing     Problem: Postpartum  Goal: Experiences normal postpartum course  Outcome: Progressing  Goal: Minimal s/sx of HDP and BP<160/110  Outcome: Progressing         VSS, bleeding and swelling minimal, pain controlled with minimal medications, IV flushed, breastfeeding/pumping.

## 2024-03-06 NOTE — SIGNIFICANT EVENT
Labor Progress Note    Subjective:   Patient having painful ctx and feeling more pressure. Requesting SVE    Objective:  Vitals:  /84   Pulse 81   Temp 36.7 °C (98.1 °F) (Temporal)   Resp 18   Ht 1.524 m (5')   Wt 68.9 kg (151 lb 14.4 oz)   LMP 06/28/2023 (Exact Date)   SpO2 100%   BMI 29.67 kg/m²   FHTs: baseline 125 moderate variability, +accels, -decels  [place]: ctx q2-6min  SVE: 5/70/-2  No bleeding     A/P:    Latent labor:   - Titrate pitocin per protocol, currently at 10  -s/p AROM  - planning for ncb  - CEFM; Cat 1 currently  - GBS pending, pcn    Discussed with Dr. Ross Nielson MD PGY-1

## 2024-03-07 PROBLEM — O14.10 SEVERE PRE-ECLAMPSIA, ANTEPARTUM (HHS-HCC): Status: ACTIVE | Noted: 2024-03-07

## 2024-03-07 PROCEDURE — 1210000001 HC SEMI-PRIVATE ROOM DAILY

## 2024-03-07 PROCEDURE — 99199 UNLISTED SPECIAL SVC PX/RPRT: CPT

## 2024-03-07 PROCEDURE — 2500000002 HC RX 250 W HCPCS SELF ADMINISTERED DRUGS (ALT 637 FOR MEDICARE OP, ALT 636 FOR OP/ED): Performed by: OBSTETRICS & GYNECOLOGY

## 2024-03-07 PROCEDURE — 2500000001 HC RX 250 WO HCPCS SELF ADMINISTERED DRUGS (ALT 637 FOR MEDICARE OP)

## 2024-03-07 RX ORDER — NEBULIZER AND COMPRESSOR
1 EACH MISCELLANEOUS ONCE
Qty: 1 EACH | Refills: 0 | Status: SHIPPED | OUTPATIENT
Start: 2024-03-07 | End: 2024-03-07

## 2024-03-07 RX ADMIN — ACETAMINOPHEN 975 MG: 325 TABLET ORAL at 02:25

## 2024-03-07 RX ADMIN — IBUPROFEN 600 MG: 600 TABLET ORAL at 23:11

## 2024-03-07 RX ADMIN — NIFEDIPINE 60 MG: 60 TABLET, FILM COATED, EXTENDED RELEASE ORAL at 08:23

## 2024-03-07 RX ADMIN — ACETAMINOPHEN 975 MG: 325 TABLET ORAL at 23:11

## 2024-03-07 RX ADMIN — IBUPROFEN 600 MG: 600 TABLET ORAL at 02:25

## 2024-03-07 ASSESSMENT — PAIN SCALES - GENERAL
PAINLEVEL_OUTOF10: 0 - NO PAIN

## 2024-03-07 NOTE — LACTATION NOTE
Lactation Consultant Note  Lactation Consultation  Reason for Consult: Follow-up assessment, Infant < 6lbs, Late  infant  Consultant Name: JOHN Darden    Maternal Information  Has mother  before?: No  Infant to breast within first 2 hours of birth?: Yes  Exclusive Pump and Bottle Feed: No    Maternal Assessment  Breast Assessment: Medium, Symmetrical, Soft, Compressible  Nipple Assessment: Intact, Erect  [place] Assessment: Normal    Infant Assessment  Infant Behavior: Deep sleep    Feeding Assessment  Nutrition Source: Breastmilk, Formula (per mother’s request)  Feeding Method: Nursing at the breast, Paced bottle    LATCH TOOL       Breast Pump  Pump: Hospital grade electric pump, Double breast pumping  Frequency: 8-10 times per day  Duration: 15-20 minutes per session  Breast Shield Size and Type: 21 mm  Units of Volume: Drops    Other OB Lactation Tools       Patient Follow-up  Inpatient Lactation Follow-up Needed : Yes    Other OB Lactation Documentation  Maternal Risk Factors: Age over 30, primiparity,  delivery <37 weeks  Infant Risk Factors: Prematurity <37 weeks, Low birth weight <2500 g, Prelacteal feeds    Recommendations/Summary    The mother was pumping when we came into the room.  She states that she supplemented her infant with a small amount of formula. She reports that the infant latches readily and well. She denies any difficulty with latching or pain/discomfort while breastfeeding. The mother plans to increase her pumping frequency. We reviewed cleaning/sterilization of breast pump parts. All questions were answered and she is offered assistance as needed.

## 2024-03-07 NOTE — CARE PLAN
The patient's goals for the shift include   Problem: Safety - Adult  Goal: Free from fall injury  Outcome: Progressing     Problem: Postpartum  Goal: Experiences normal postpartum course  Outcome: Progressing  Goal: Minimal s/sx of HDP and BP<160/110  Outcome: Progressing         VSS, bleeding and swelling minimal, pain controlled with minimal medications, IV flushed, breastfeeding/formula feeding/pumping.

## 2024-03-07 NOTE — PROGRESS NOTES
Social Work Note    Patient: Thang Holland, 32yo,     SW met with Ms Holland for assessment. She was receptive and engaged. She states she lives alone with  boy Meño Holland at her home in [place] and has a very strong support system including her mother and brother. Ms Holland states FOB will be involved. She states she has needed items for  including safe sleep and car seat. Safe sleep reviewed. Ms Holland denies financial concerns. 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 She has PTO and shot term disability benefits during her medical leave and denies financial concerns at this time. She has a history of anxiety with medication in  per chart and denies signs and symptoms of anxiety and depression at this time. SW reviewed postpartum depression signs, symptoms, and resources and  indicated understanding. Ms Holland indicates understanding, states she would reach out to her support system as needed. No concerns or needs noted. Ms Holland and  clear from SW perspective.     TERESO Garcia

## 2024-03-07 NOTE — PROGRESS NOTES
Postpartum Progress Note    Assessment/Plan   Thang Holland is a 33 y.o., , who delivered at 36w0d gestation and is now postpartum day 0 s/p  now on 24 hrs pp Mg    s/p  PPD#0  -Pain well controlled with PO pain meds  -Afebrile, ambulating   -Tolerating regular diet with anti-emetics PRN and bowel regimen ordered  -Voiding spontaneously using bedside commode while on Mg  -Admission hgb 10.7--> ; Continue to monitor for si/sx of anemia.   -Continue routine postpartum care     sPEC  - severe Bps req IV tx  - HELLP labs wnl x1, P:C 5.23  - nifed 60 (3/5)  - Mg gtt @2g til 0500    Feeding - breast/formula  -Continue to encourage breast feeding  -Lactation consult as needed    Postpartum Birth control  -declines    DVT prophylaxis  -VTE risk score = 3  -SCDs  -Ambulation    Dispo: anticipate discharge on PPD#3 if continues to meet milestones. Plan for follow up in 5-7 days or BP check, 4-6 weeks for postpartum visit with OB provider.    Seen and discussed with Dr. Sakina Nielson MD PGY-1        Principal Problem:    Labor and delivery indication for care or intervention    Pregnancy Problems (from 23 to present)       Problem Noted Resolved    Vaginal bleeding in pregnancy 3/5/2024 by Tere Garcia, MANOLO No    Priority:  Medium      Labor and delivery indication for care or intervention 3/5/2024 by Rj Toney,  No    Priority:  Medium      Placental abruption in third trimester 2024 by Sami Sweeney MD No    Priority:  Medium      Anemia complicating pregnancy, third trimester 2024 by Sami Sweeney MD No    Priority:  Medium      Poor fetal growth affecting management of mother in third trimester 2024 by Sami Sweeney MD No    Priority:  Medium      Pyelectasis of fetus on prenatal ultrasound 2024 by Sami Sweeney MD No    Priority:  Medium      Overview Addendum 2024  2:38 PM by Sami Sweeney MD     Noted at US on 24  Left: 7.6 mm  Also noted to  have PRUV.          Labor and delivery indication for care or intervention 1/28/2024 by Savanna Irving MD 1/29/2024 by Sami Sweeney MD          Hospital course: sPEC  Vaginal Birth  Patient is currently breastfeedingThe patient's blood type is B POS.    Subjective   Her pain is well controlled with current medications  She is passing flatus  She is tolerating a Adult diet Regular  She reports no breast or nursing problems  She denies emotional concerns today      Patient does not report headaches, visual changes, chest pain, shortness of breath or RUQ pain      Objective   Allergies:   Patient has no known allergies.         Last Vitals:  Temp Pulse Resp BP MAP Pulse Ox   36.9 °C (98.4 °F) 92 14 122/75   98 %     Vitals Min/Max Last 24 Hours:  Temp  Min: 36.3 °C (97.3 °F)  Max: 36.9 °C (98.4 °F)  Pulse  Min: 71  Max: 100  Resp  Min: 14  Max: 18  BP  Min: 111/65  Max: 163/91    Intake/Output:     Intake/Output Summary (Last 24 hours) at 3/7/2024 0042  Last data filed at 3/7/2024 0033  Gross per 24 hour   Intake 3516.91 ml   Output 4500 ml   Net -983.09 ml       Physical Exam:  General: Examination reveals a well developed, well nourished, female, in no acute distress. She is alert and cooperative.  Lungs: clear to auscultation bilaterally  Abdomen: soft, nontender, nondistended, no abnormal masses, no epigastric pain.  Fundus: firm.  Neuro 2+ DTRs  Extremities: no redness or tenderness in the calves or thighs, no edema.    Lab Data:  Labs in chart were reviewed.

## 2024-03-07 NOTE — LACTATION NOTE
Lactation Consultant Note  Lactation Consultation  Reason for Consult: Initial assessment, Infant < 6lbs, Late  infant  Consultant Name: Kayla Fritz RN, IBCLC    Maternal Information  Has mother  before?: No  Infant to breast within first 2 hours of birth?: Yes  Exclusive Pump and Bottle Feed: No    Maternal Assessment  Breast Assessment: Medium, Symmetrical  Nipple Assessment: Intact, Erect  Areola Assessment: Normal    Infant Assessment  Infant Behavior: Awake, Fussy, Feeding cues observed  Infant Assessment: Premature, Good cupping of tongue, Able to elevate tongue to roof of mouth    Feeding Assessment  Nutrition Source: Breastmilk  Feeding Method: Nursing at the breast, Feeding expressed breastmilk  Feeding Position: Cross - cradle  Suck/Feeding: Sustained, Content after feeding, Tactile stimulation needed  Latch Assessment: Moderate assistance is needed, Flanged lips, Chin moves in rhythmic motion, Instructed on deep latch, Deep latch obtained, Areolar attachment, Latch achieved after repeated attempts    LATCH TOOL  Latch: Grasps breast, tongue down, lips flanged, rhythmic sucking  Audible Swallowing: Spontaneous and intermittent (24 hours old)  Type of Nipple: Everted (After stimulation)  Comfort (Breast/Nipple): Soft/non-tender  Hold (Positioning): Minimal assist, teach one side, mother does other, staff holds  LATCH Score: 9    Breast Pump  Pump: Hospital grade electric pump  Frequency: 8-10 times per day  Duration: Initiate phase  Breast Shield Size and Type: 24 mm    Other OB Lactation Tools       Patient Follow-up  Inpatient Lactation Follow-up Needed : Yes    Other OB Lactation Documentation  Infant Risk Factors: Prematurity <37 weeks    Recommendations/Summary  Assisted mom to latch baby skin to skin in cross cradle hold. Educated on hand placement on breast and baby and worked on timing with mom. After several attempts and baby intermittently tongue sucking/thrusting, baby was able  to sustain a deep latch on mom's left breast.  Lips were flanged, nose and chin touching the breast, rhythmic sucking and some audible swallowing. Baby's body was well aligned with belly in towards mom.       After 10 mins baby pulled off, mom sat baby up for a burp and then tried him on her right breast. Baby was sleepy and reluctant so baby was swaddled and given one more opportunity to latch, he did latch, but was unable to sustain a rhythmic sucking pattern. Mom was instructed to follow this feed with a pump session and to put baby to breast again in 3hrs or less, depending on baby's cues.  Discussed the process of milk production and release and what to expect from baby in the coming days/weeks. Educated mom on the challenges of feeding a premature infant and encouraged her to call for any assistance or support needed. Out patient lactation info was given. Mom believes that her relative has gotten her a breast pump, but just in case, I gave her the names of medical suppliers that she can use her insurance to order a breast pump from for home use.

## 2024-03-07 NOTE — PROGRESS NOTES
Postpartum Progress Note      Assessment/Plan     Thang Holland is a 33 y.o.,  initially presented for  vaginal bleeding  She had a Vaginal, Spontaneous   delivery on 3/6/2024  at 36w0d and is now postpartum day 1.    PEC w SF  - severe Bps req IV tx  - HELLP labs wnl x1, P:C 5.23  - BP's normotensive to low mild range on nifed 60 (3/5)  - s/p Mg gtt     #Postpartum  - continue routine postpartum care  - pain well controlled on po medications  - DVT Score: 3 ; SCDs  - The patient's blood type is B POS. Rhogam is not indicated.    #Maternal Well-Being  - emotional support provided  - Contraception: Defers contraception to primary OB/PP visit. We discussed pregnancy spacing of at least one year, abstaining from intercourse for 6wks, and the ability to become pregnant in the absence of regular menses. Pt verbalized understanding.     #[place] Feeding  - breastfeeding/pumping encouraged; lactation consult prn    #Dispo  - Anticipate DC PPD#3 pending BP control.  - The signs and symptoms of PEC were reviewed with the patient, including unrelenting headache, vision changes/blurred vision, and RUQ pain  - BP cuff for home for checking BP twice a day  - Pt instructed to call primary OB if SBP > 160 or DBP > 110 or if development of PEC symptoms   - On discharge, follow up with primary OB in:       - 2-5 days for BP check       - 4-6 week for post-partum visit       Principal Problem:    Labor and delivery indication for care or intervention      Subjective   Denies HA, N/V, RUQ pain, vision changes. Denies SOB/FERRELL, chest pain/pressure.    Meeting all postpartum milestones- ambulating independently, passing flatus, tolerating PO intake, lochia light, voiding spontaneously, and pain well controlled with PO meds.    Objective   Physical Exam:  General: well appearing, well nourished, postpartum  Obstetric: fundus firm below umbilicus, lochia light  Skin: Warm, dry; no rashes/lesions/erythema  Breast: No masses, nipple  discharge  Neuro: A/Ox3, no gross motor deficit   GI: no distension, appropriately tender, soft, +BS  Respiratory: Even and unlabored on RA, LSCTA BL  Cardiovascular: 2+ BLE edema; No erythema, warmth  Psych: appropriate mood and affect, conversational    Last Vitals:  Temp Pulse Resp BP MAP Pulse Ox   36.9 °C (98.4 °F) 97 16 (!) 147/93 (rn noitified)   97 %       Vitals Min/Max Last 24 Hours:  Temp  Min: 36.6 °C (97.9 °F)  Max: 36.9 °C (98.4 °F)  Pulse  Min: 84  Max: 100  Resp  Min: 14  Max: 18  BP  Min: 111/65  Max: 147/93    Lab Data:  Lab Results   Component Value Date    WBC 12.6 (H) 03/05/2024    HGB 11.3 (L) 03/05/2024    HCT 31.7 (L) 03/05/2024     03/05/2024

## 2024-03-08 LAB
BLOOD EXPIRATION DATE: NORMAL
BLOOD EXPIRATION DATE: NORMAL
DISPENSE STATUS: NORMAL
DISPENSE STATUS: NORMAL
GP B STREP GENITAL QL CULT: NORMAL
PRODUCT BLOOD TYPE: 7300
PRODUCT BLOOD TYPE: 7300
PRODUCT CODE: NORMAL
PRODUCT CODE: NORMAL
UNIT ABO: NORMAL
UNIT ABO: NORMAL
UNIT NUMBER: NORMAL
UNIT NUMBER: NORMAL
UNIT RH: NORMAL
UNIT RH: NORMAL
UNIT VOLUME: 350
UNIT VOLUME: 350
XM INTEP: NORMAL
XM INTEP: NORMAL

## 2024-03-08 PROCEDURE — 2500000002 HC RX 250 W HCPCS SELF ADMINISTERED DRUGS (ALT 637 FOR MEDICARE OP, ALT 636 FOR OP/ED)

## 2024-03-08 PROCEDURE — 2500000001 HC RX 250 WO HCPCS SELF ADMINISTERED DRUGS (ALT 637 FOR MEDICARE OP)

## 2024-03-08 PROCEDURE — 2500000002 HC RX 250 W HCPCS SELF ADMINISTERED DRUGS (ALT 637 FOR MEDICARE OP, ALT 636 FOR OP/ED): Performed by: OBSTETRICS & GYNECOLOGY

## 2024-03-08 PROCEDURE — 1210000001 HC SEMI-PRIVATE ROOM DAILY

## 2024-03-08 RX ORDER — NIFEDIPINE 90 MG/1
90 TABLET, EXTENDED RELEASE ORAL EVERY 24 HOURS
Status: DISCONTINUED | OUTPATIENT
Start: 2024-03-09 | End: 2024-03-09

## 2024-03-08 RX ORDER — NIFEDIPINE 30 MG/1
30 TABLET, FILM COATED, EXTENDED RELEASE ORAL ONCE
Status: COMPLETED | OUTPATIENT
Start: 2024-03-08 | End: 2024-03-08

## 2024-03-08 RX ADMIN — ACETAMINOPHEN 975 MG: 325 TABLET ORAL at 04:53

## 2024-03-08 RX ADMIN — ACETAMINOPHEN 975 MG: 325 TABLET ORAL at 18:30

## 2024-03-08 RX ADMIN — ACETAMINOPHEN 975 MG: 325 TABLET ORAL at 11:23

## 2024-03-08 RX ADMIN — IBUPROFEN 600 MG: 600 TABLET ORAL at 18:30

## 2024-03-08 RX ADMIN — NIFEDIPINE 60 MG: 60 TABLET, FILM COATED, EXTENDED RELEASE ORAL at 08:23

## 2024-03-08 RX ADMIN — IBUPROFEN 600 MG: 600 TABLET ORAL at 04:53

## 2024-03-08 RX ADMIN — IBUPROFEN 600 MG: 600 TABLET ORAL at 11:24

## 2024-03-08 RX ADMIN — NIFEDIPINE 30 MG: 30 TABLET, FILM COATED, EXTENDED RELEASE ORAL at 15:30

## 2024-03-08 ASSESSMENT — PAIN SCALES - GENERAL
PAINLEVEL_OUTOF10: 0 - NO PAIN
PAINLEVEL_OUTOF10: 2
PAINLEVEL_OUTOF10: 0 - NO PAIN
PAINLEVEL_OUTOF10: 2
PAINLEVEL_OUTOF10: 0 - NO PAIN

## 2024-03-08 ASSESSMENT — PAIN DESCRIPTION - LOCATION
LOCATION: PERINEUM
LOCATION: ABDOMEN

## 2024-03-08 ASSESSMENT — PAIN - FUNCTIONAL ASSESSMENT: PAIN_FUNCTIONAL_ASSESSMENT: 0-10

## 2024-03-08 NOTE — PROGRESS NOTES
Postpartum Progress Note    Assessment/Plan   Thang Holland is a 33 y.o., , who delivered at 36w0d gestation    Now PPD#2 s/p Vaginal, Spontaneous  on 3/6/2024   - c/b placental abruption   - continue routine postpartum care  - pain well controlled on po medications  - dvt risk score DVT Score: 3 , ppx with scds  - Hgb:   Results from last 7 days   Lab Units 24  2237 24  1328   HEMOGLOBIN g/dL 11.3* 10.7*      sPEC  - severe Bps req IV tx  - HELLP labs wnl x1, P:C 5.23  - Increased to Nifed 90 daily   - s/p Mg gtt     Maternal Well-Being  - emotional support provided     Feeding  - breastfeeding/pumping encouraged; lactation consult prn    Contraception  - education provided  - Patient defers contraception to her 6 wk PP f/u. Recommended pelvic rest and condoms. Reviewed risk of short interval pregnancy,  pt verbalizes understanding.     Dispo  - Anticipate DC PPD3 pending BP control.  - The signs and symptoms of PEC were reviewed with the patient, including unrelenting headache, vision changes/blurred vision, and pain underneath the right breast.   - BP cuff for home for checking BP BID. Pt instructed to call primary OB if SBP > 160 or DBP > 110.   Follow up in 2-5 days for BP check with your OB provider. and Follow up in 4-6 wk for postpartum visit with your OB provider.     Kelle Appiah PA-C  Postpartum Pager [phone]    Principal Problem:     (normal spontaneous vaginal delivery)  Active Problems:    Severe pre-eclampsia, antepartum    Pregnancy Problems (from 24 to present)       Problem Noted Resolved    Severe pre-eclampsia, antepartum 3/7/2024 by JASWANT Munoz-CNP No    Priority:  Medium      Vaginal bleeding in pregnancy 3/5/2024 by Tere Garcia, RN No    Priority:  Medium       (normal spontaneous vaginal delivery) 3/5/2024 by Rj Toney,  No    Priority:  Medium      Placental abruption in third trimester 2024 by Sami Sweeney MD No    Priority:   Medium      Anemia complicating pregnancy, third trimester 1/29/2024 by Sami Sweeney MD No    Priority:  Medium      Poor fetal growth affecting management of mother in third trimester 1/29/2024 by Sami Sweeney MD No    Priority:  Medium      Pyelectasis of fetus on prenatal ultrasound 1/29/2024 by Sami Sweeney MD No    Priority:  Medium      Overview Addendum 1/29/2024  2:38 PM by Sami Sweeney MD     Noted at US on 1/19/24  Left: 7.6 mm  Also noted to have PRUV.          Labor and delivery indication for care or intervention 1/28/2024 by Savanna Irving MD 1/29/2024 by Sami Sweeney MD          Hospital course: postpartum preeclampsia/eclampsia  Vaginal Birth  Patient is currently breastfeedingThe patient's blood type is B POS. Rhogam is not indicated.    Subjective   Her pain is well controlled with current medications  She is passing flatus  She is ambulating well  She is tolerating a Adult diet Regular  She reports no breast or nursing problems  She denies emotional concerns today   Her plan for contraception is none     Pt seen at the bedside in NAD. Denies HA, N/V, RUQ pain, vision changes.   Denies CP, SOB, calf pain, fever, passage of large clots.     Objective   Allergies:   Patient has no known allergies.         Last Vitals:  Temp Pulse Resp BP MAP Pulse Ox   36.9 °C (98.4 °F) 91 18 (!) 157/90 (notified MANOLO Ascencio)   98 %     Vitals Min/Max Last 24 Hours:  Temp  Min: 36.5 °C (97.7 °F)  Max: 37.1 °C (98.8 °F)  Pulse  Min: 88  Max: 103  Resp  Min: 16  Max: 19  BP  Min: 127/77  Max: 157/90    Intake/Output:   No intake or output data in the 24 hours ending 03/08/24 1459    Physical Exam:  General: Examination reveals a well developed, well nourished, female, in no acute distress. She is alert and cooperative.  HEENT: External ears normal. Nose normal, no erythema or discharge.  Neck: supple, no significant adenopathy.  Lungs: breathing even and unlabored   Cardiac: warm and well perfused  .  Fundus: firm and below umbilicus. Lochia light  Extremities: no redness or tenderness in the calves or thighs, no edema.  Neurological: alert, oriented, normal speech, no focal findings or movement disorder noted.  Psychological: awake and alert; oriented to person, place, and time.  Skin: no rashes or lesions    Lab Data:  Labs in chart were reviewed.

## 2024-03-08 NOTE — LACTATION NOTE
This note was copied from a baby's chart.  Lactation Consultant Note  Lactation Consultation  Reason for Consult: Follow-up assessment, Late  infant  Consultant Name: JOHN Darden    Maternal Information  Has mother  before?: No  Infant to breast within first 2 hours of birth?: Yes  Exclusive Pump and Bottle Feed: No  WIC Program: No    Maternal Assessment  Breast Assessment: Medium, Symmetrical, Soft, Compressible  Nipple Assessment: Intact, Erect  Areola Assessment: Normal    Infant Assessment  Infant Behavior: Other (Comment) (infant in nursery for circumcision)    Feeding Assessment  Nutrition Source: Breastmilk, Formula (medically indicated)  Feeding Method: Nursing at the breast, Paced bottle    LATCH TOOL       Breast Pump  Pump: Hospital grade electric pump, Double breast pumping  Frequency: Less than 3 times per day  Duration: 15-20 minutes per session  Breast Shield Size and Type: 21 mm  Units of Volume: Drops    Other OB Lactation Tools       Patient Follow-up  Inpatient Lactation Follow-up Needed : No  Outpatient Lactation Follow-up: Recommended    Other OB Lactation Documentation  Maternal Risk Factors: Age over 30, primiparity, Hypertension, Preeclampsia,  delivery <37 weeks  Infant Risk Factors: Prematurity <37 weeks, Low birth weight <2500 g, Prelacteal feeds    Recommendations/Summary    I did not view a latch during this visit. The infant is in the nursery for circumcision. The mother reports that she had been latching her infant to the breast and supplementing with formula after breastfeeding. The mother states that she has not been consistently pumping due to fatigue. She is not yet able to collect any colostrum in the bottle. I explained how early nipple stimulation, either through breastfeeding or pumping, is essential to long term milk production. Since she is breastfeeding, I asked that she increase her pumping to 6 times/24 hours. The mother verbalized  understanding. The mother is attempting to be discharged to home today. She does not yet have a breast, but states that she has access to one. I suggested that she contact her insurance company to find out which DME they contract with for breast pumps. Hopefully, we can place the order for her prior to discharge. I reminded her of the availability of outpatient lactation services. She is offered assistance as needed.

## 2024-03-09 VITALS
RESPIRATION RATE: 18 BRPM | TEMPERATURE: 98.2 F | HEIGHT: 60 IN | HEART RATE: 105 BPM | DIASTOLIC BLOOD PRESSURE: 96 MMHG | BODY MASS INDEX: 29.82 KG/M2 | SYSTOLIC BLOOD PRESSURE: 141 MMHG | WEIGHT: 151.9 LBS | OXYGEN SATURATION: 98 %

## 2024-03-09 PROCEDURE — 99238 HOSP IP/OBS DSCHRG MGMT 30/<: CPT

## 2024-03-09 PROCEDURE — 2500000001 HC RX 250 WO HCPCS SELF ADMINISTERED DRUGS (ALT 637 FOR MEDICARE OP)

## 2024-03-09 PROCEDURE — 2500000002 HC RX 250 W HCPCS SELF ADMINISTERED DRUGS (ALT 637 FOR MEDICARE OP, ALT 636 FOR OP/ED)

## 2024-03-09 RX ORDER — NIFEDIPINE 60 MG/1
120 TABLET, FILM COATED, EXTENDED RELEASE ORAL
Qty: 60 TABLET | Refills: 0 | Status: SHIPPED | OUTPATIENT
Start: 2024-03-09 | End: 2024-04-08

## 2024-03-09 RX ORDER — NIFEDIPINE 60 MG/1
120 TABLET, FILM COATED, EXTENDED RELEASE ORAL EVERY 24 HOURS
Status: DISCONTINUED | OUTPATIENT
Start: 2024-03-09 | End: 2024-03-09 | Stop reason: HOSPADM

## 2024-03-09 RX ORDER — IBUPROFEN 600 MG/1
600 TABLET ORAL EVERY 6 HOURS
Qty: 40 TABLET | Refills: 0 | Status: SHIPPED | OUTPATIENT
Start: 2024-03-09 | End: 2024-03-19

## 2024-03-09 RX ORDER — ACETAMINOPHEN 325 MG/1
975 TABLET ORAL EVERY 6 HOURS
Qty: 120 TABLET | Refills: 0 | Status: SHIPPED | OUTPATIENT
Start: 2024-03-09 | End: 2024-03-19

## 2024-03-09 RX ADMIN — NIFEDIPINE 120 MG: 60 TABLET, FILM COATED, EXTENDED RELEASE ORAL at 08:34

## 2024-03-09 RX ADMIN — ACETAMINOPHEN 975 MG: 325 TABLET ORAL at 05:53

## 2024-03-09 RX ADMIN — IBUPROFEN 600 MG: 600 TABLET ORAL at 11:53

## 2024-03-09 RX ADMIN — IBUPROFEN 600 MG: 600 TABLET ORAL at 05:53

## 2024-03-09 RX ADMIN — ACETAMINOPHEN 975 MG: 325 TABLET ORAL at 00:32

## 2024-03-09 RX ADMIN — ACETAMINOPHEN 975 MG: 325 TABLET ORAL at 11:53

## 2024-03-09 RX ADMIN — IBUPROFEN 600 MG: 600 TABLET ORAL at 00:31

## 2024-03-09 ASSESSMENT — PAIN SCALES - GENERAL
PAINLEVEL_OUTOF10: 0 - NO PAIN
PAINLEVEL_OUTOF10: 1
PAINLEVEL_OUTOF10: 0 - NO PAIN

## 2024-03-09 NOTE — LACTATION NOTE
Lactation Consultant Note  Lactation Consultation  Reason for Consult: Initial assessment  Consultant Name: Prudence Saravia    Maternal Information  Has mother  before?: No  Infant to breast within first 2 hours of birth?: Yes  Exclusive Pump and Bottle Feed: No    Maternal Assessment  Breast Assessment: Small, Filling, Symmetrical  Nipple Assessment: Intact, Erect  Areola Assessment: Normal    Feeding Assessment  Nutrition Source: Breastmilk, Formula (medically indicated)  Feeding Method: Nursing at the breast, Paced bottle  Feeding Position: Skin to skin    Breast Pump  Pump: Hospital grade electric pump  Frequency: 8-10 times per day  Duration: 15-20 minutes per session  Breast Shield Size and Type: 21 mm  Volume of Milk Production: 2  Units of Volume: mL per session    Other OB Lactation Tools  Lactation Tools: Flanges    Patient Follow-up  Inpatient Lactation Follow-up Needed : No  Outpatient Lactation Follow-up: Recommended  Lactation Professional - OK to Discharge: Yes    Other OB Lactation Documentation  Maternal Risk Factors: Age over 30, primiparity, Preeclampsia, Hypertension  Infant Risk Factors: Prematurity <37 weeks    Recommendations/Summary  Mom had just finished breastfeeding baby before I entered the room and was holding him skin to skin. Mom states that baby is latching well to the breast and that the latch is not painful. Baby is typically feeding for about 15 minutes now. She says that yesterday, baby was much sleepier at the breast and only fed for about 5 minutes or so, but overnight, baby began to be more alert and was feeding for longer stretches. Mom is pumping after feeds for additional stimulation, as baby is 36 weeks. Mom is producing 1-2 mls now per pump session. Discussed normal feeding behavior for  newborns. Reviewed benefits of skin to skin contact for mom and baby and for mom's milk production and supply. Mom is also supplementing with formula via paced bottle feeding  after breastfeeds.    Mom needs a breast pump for at home. Her insurance is [employer], which is an insurance company that I am not familiar with. I called the phone number listed on the insurance company website but they do not have representatives available on the weekends. I looked at the websites for [employer] and [employer] but they did not have a comprehensive list of insurance companies that they work with. I decided to order a pump through [employer] and noted on the form for [employer] to call mom if the pump is not covered. I provided mom with the phone number for her insurance company and for [employer]. I instructed her to call her insurance company on Monday so that they can possibly give her more information as to where she can order a pump. I gave her a hand pump to take home and instructed her on its use. We reviewed the outpatient lactation information.

## 2024-03-09 NOTE — NURSING NOTE
Patient meets criteria for home monitoring of blood pressure post discharge.  Met with patient to assess for availability of home BP monitor.  Patient does not have access to BP monitor at home.  Pt agreed to order home BP monitor from [employer]. BP monitor delivered to room.  Patient educated on how to use BP monitor, recording BP’s on home monitoring log and s/sx to report to her provider.  Pt verbalized understanding the above information.

## 2024-03-09 NOTE — DISCHARGE SUMMARY
Discharge Summary    Admission Date: 3/5/2024  Discharge Date: 3/9/2024     Discharge Diagnosis   (normal spontaneous vaginal delivery)    Hospital Course  Patient presented for vaginal bleeding in s/o known placental abruption, decision made to induce labor given active bleeding, painfully lela and elevated Bps.     Delivery Date: 3/6/2024  5:07 AM   Delivery type: Vaginal, Spontaneous    GA at delivery: 36w0d  Outcome: Living   Anesthesia during delivery: None   Intrapartum complications: None   Feeding method: Breastfeeding Status: Yes     Procedures:    Contraception at discharge: none    Postpartum Course:  - Pain controlled on PO meds  - Placental Abruption: coags, fibrinogen, plts stable Hgb at dc 11.3  - sPEC: dx by severe range bps, received 24 hrs pp mag ppx, now bp stable on Nifed 120mg daily, encouraged further monitoring of bp given uptitration of medication this morning however patient declined, asx, reviewed PEC risks     Patient seen on day of DC. Continuing to meet all PP milestones. All questions/concerns addressed. She is medically stable and feels comfortable going home today w/ follow up as below.  - The signs and symptoms of PEC were reviewed with the patient, including unrelenting headache, vision changes/blurred vision, and pain underneath the right breast.   - BP cuff for home for checking BP BID. Pt instructed to call primary OB if SBP > 160 or DBP > 110.   BILLY Mcgee/Cruz    Pertinent Physical Exam At Time of Discharge  General: Examination reveals a well developed, well nourished, female, in no acute distress. She is alert and cooperative.  Abdomen: soft, non-distended, non-tender to palpation.  Fundus: firm, below umbilicus, and nontender.  Psychological: awake and alert; oriented to person, place, and time.  Skin: no rashes or lesions     Discharge Meds     Your medication list        START taking these medications        Instructions Last Dose Given  Next Dose Due   acetaminophen 325 mg tablet  Commonly known as: Tylenol      Take 3 tablets (975 mg) by mouth every 6 hours for 10 days.       ibuprofen 600 mg tablet      Take 1 tablet (600 mg) by mouth every 6 hours for 10 days.       NIFEdipine ER 60 mg 24 hr tablet  Commonly known as: Adalat CC      Take 2 tablets (120 mg) by mouth once daily in the morning. Take before meals. Do not crush, chew, or split.              CONTINUE taking these medications        Instructions Last Dose Given Next Dose Due   ergocalciferol 1.25 MG (66517 UT) capsule  Commonly known as: Vitamin D-2           ferrous sulfate 325 (65 Fe) MG EC tablet      Take 1 tablet by mouth 2 times a day with meals. Do not crush, chew, or split.       PRENATAL 19 ORAL                  STOP taking these medications      aspirin 81 mg chewable tablet               ASK your doctor about these medications        Instructions Last Dose Given Next Dose Due   Blood Pressure Cuff misc  Generic drug: miscellaneous medical supply  Ask about: Should I take this medication?      1 each 1 time for 1 dose.                 Where to Get Your Medications        These medications were sent to [facility]/pharmacy #[id number] - [place] [id number] [facility] [place] [address] [facility] [address]      Phone: [phone]   acetaminophen 325 mg tablet  Blood Pressure Cuff misc  ibuprofen 600 mg tablet  NIFEdipine ER 60 mg 24 hr tablet          Complications Requiring Follow-Up  Follow up in 2-5 days for BP check with your OB provider. and Follow up in 4-6 wk for postpartum visit with your OB provider.     Test Results Pending At Discharge  Pending Labs       Order Current Status    Surgical Pathology Exam - PLACENTA In process            Outpatient Follow-Up  Future Appointments   Date Time Provider Department Center   4/22/2024  2:45 PM Chelsea Sigala MD [id number]       I spent 15 minutes in the professional and overall care of this  patient.      Kelle Appiah PA-C

## 2024-03-11 ENCOUNTER — APPOINTMENT (OUTPATIENT)
Dept: OBSTETRICS AND GYNECOLOGY | Facility: CLINIC | Age: 34
End: 2024-03-11
Payer: COMMERCIAL

## 2024-03-11 LAB
LABORATORY COMMENT REPORT: NORMAL
PATH REPORT.FINAL DX SPEC: NORMAL
PATH REPORT.GROSS SPEC: NORMAL
PATH REPORT.RELEVANT HX SPEC: NORMAL
PATH REPORT.TOTAL CANCER: NORMAL
RESIDENT REVIEW: NORMAL

## 2024-03-18 ENCOUNTER — DOCUMENTATION (OUTPATIENT)
Dept: POSTPARTUM | Facility: HOSPITAL | Age: 34
End: 2024-03-18
Payer: COMMERCIAL

## 2024-03-22 LAB — SCAN RESULT: NORMAL

## 2024-04-02 ENCOUNTER — APPOINTMENT (OUTPATIENT)
Dept: OBSTETRICS AND GYNECOLOGY | Facility: CLINIC | Age: 34
End: 2024-04-02
Payer: COMMERCIAL

## 2024-04-16 ENCOUNTER — POSTPARTUM VISIT (OUTPATIENT)
Dept: OBSTETRICS AND GYNECOLOGY | Facility: CLINIC | Age: 34
End: 2024-04-16
Payer: COMMERCIAL

## 2024-04-16 VITALS
SYSTOLIC BLOOD PRESSURE: 110 MMHG | BODY MASS INDEX: 26.74 KG/M2 | WEIGHT: 136.2 LBS | HEIGHT: 60 IN | DIASTOLIC BLOOD PRESSURE: 62 MMHG

## 2024-04-16 PROCEDURE — 0503F POSTPARTUM CARE VISIT: CPT | Performed by: OBSTETRICS & GYNECOLOGY

## 2024-04-16 NOTE — PATIENT INSTRUCTIONS
Congratulations on the birth of your beautiful baby boy!      Review the postpartum information pack provided today.      Return to the office in 3 to 4 months for your annual GYN exam.      A refill of your prenatal vitamin has been sent to the pharmacy.  Continue take 1 tablet daily while breast-feeding.      Feel free to call the office with any problems, questions or concerns prior to your next scheduled visit.

## 2024-04-16 NOTE — PROGRESS NOTES
33-year-old -1-0-1 -American woman presents ~6 weeks s/p  placated by placental abruption and sPEC.     She denies any nausea, vomiting, fever, chills, abnormal discharge or heavy vaginal bleeding.  She is without any postpartum depression or blues.  Breast-feeding is going well and the baby has gained 2 pounds since delivery.  She returns to work on Monday/in a few days and needs a return to work note.    O: WDWN woman in NAD, A&O x3  Abdomen - soft, NT, ND   Pelvic - NEFG, normal BUS   Cervix - no CMT  Uterus - WNL  Adnexa - WNL       A/P: PPV     -  RTW note    -  PP info packet     -  PNV refill

## 2024-04-22 ENCOUNTER — APPOINTMENT (OUTPATIENT)
Dept: OBSTETRICS AND GYNECOLOGY | Facility: CLINIC | Age: 34
End: 2024-04-22
Payer: COMMERCIAL